# Patient Record
Sex: MALE | Race: WHITE | NOT HISPANIC OR LATINO | URBAN - METROPOLITAN AREA
[De-identification: names, ages, dates, MRNs, and addresses within clinical notes are randomized per-mention and may not be internally consistent; named-entity substitution may affect disease eponyms.]

---

## 2020-11-29 ENCOUNTER — INPATIENT (INPATIENT)
Facility: HOSPITAL | Age: 35
LOS: 5 days | Discharge: HOME | End: 2020-12-05
Attending: GENERAL PRACTICE | Admitting: GENERAL PRACTICE
Payer: MEDICAID

## 2020-11-29 VITALS
SYSTOLIC BLOOD PRESSURE: 123 MMHG | RESPIRATION RATE: 18 BRPM | OXYGEN SATURATION: 96 % | TEMPERATURE: 98 F | DIASTOLIC BLOOD PRESSURE: 81 MMHG | HEART RATE: 90 BPM

## 2020-11-29 LAB
ALBUMIN SERPL ELPH-MCNC: 4.7 G/DL — SIGNIFICANT CHANGE UP (ref 3.3–5)
ALP SERPL-CCNC: 63 U/L — SIGNIFICANT CHANGE UP (ref 40–120)
ALT FLD-CCNC: 40 U/L — SIGNIFICANT CHANGE UP (ref 4–41)
ANION GAP SERPL CALC-SCNC: 12 MMO/L — SIGNIFICANT CHANGE UP (ref 7–14)
AST SERPL-CCNC: 23 U/L — SIGNIFICANT CHANGE UP (ref 4–40)
BASE EXCESS BLDV CALC-SCNC: -0.9 MMOL/L — SIGNIFICANT CHANGE UP
BILIRUB SERPL-MCNC: 0.3 MG/DL — SIGNIFICANT CHANGE UP (ref 0.2–1.2)
BLOOD GAS VENOUS - CREATININE: 1.11 MG/DL — SIGNIFICANT CHANGE UP (ref 0.5–1.3)
BLOOD GAS VENOUS - FIO2: 21 — SIGNIFICANT CHANGE UP
BUN SERPL-MCNC: 14 MG/DL — SIGNIFICANT CHANGE UP (ref 7–23)
CALCIUM SERPL-MCNC: 9.7 MG/DL — SIGNIFICANT CHANGE UP (ref 8.4–10.5)
CHLORIDE BLDV-SCNC: 105 MMOL/L — SIGNIFICANT CHANGE UP (ref 96–108)
CHLORIDE SERPL-SCNC: 101 MMOL/L — SIGNIFICANT CHANGE UP (ref 98–107)
CO2 SERPL-SCNC: 24 MMOL/L — SIGNIFICANT CHANGE UP (ref 22–31)
CREAT SERPL-MCNC: 1.07 MG/DL — SIGNIFICANT CHANGE UP (ref 0.5–1.3)
GAS PNL BLDV: 141 MMOL/L — SIGNIFICANT CHANGE UP (ref 136–146)
GLUCOSE BLDV-MCNC: 111 MG/DL — HIGH (ref 70–99)
GLUCOSE SERPL-MCNC: 114 MG/DL — HIGH (ref 70–99)
HCO3 BLDV-SCNC: 22 MMOL/L — SIGNIFICANT CHANGE UP (ref 20–27)
HCT VFR BLD CALC: 46.6 % — SIGNIFICANT CHANGE UP (ref 39–50)
HCT VFR BLDV CALC: 51.6 % — HIGH (ref 39–51)
HGB BLD-MCNC: 15.9 G/DL — SIGNIFICANT CHANGE UP (ref 13–17)
HGB BLDV-MCNC: 16.9 G/DL — SIGNIFICANT CHANGE UP (ref 13–17)
LACTATE BLDV-MCNC: 3.9 MMOL/L — HIGH (ref 0.5–2)
MCHC RBC-ENTMCNC: 28.8 PG — SIGNIFICANT CHANGE UP (ref 27–34)
MCHC RBC-ENTMCNC: 34.1 % — SIGNIFICANT CHANGE UP (ref 32–36)
MCV RBC AUTO: 84.3 FL — SIGNIFICANT CHANGE UP (ref 80–100)
NRBC # FLD: 0 K/UL — SIGNIFICANT CHANGE UP (ref 0–0)
PCO2 BLDV: 47 MMHG — SIGNIFICANT CHANGE UP (ref 41–51)
PH BLDV: 7.34 PH — SIGNIFICANT CHANGE UP (ref 7.32–7.43)
PLATELET # BLD AUTO: 357 K/UL — SIGNIFICANT CHANGE UP (ref 150–400)
PMV BLD: 9.4 FL — SIGNIFICANT CHANGE UP (ref 7–13)
PO2 BLDV: 29 MMHG — LOW (ref 35–40)
POTASSIUM BLDV-SCNC: 3.5 MMOL/L — SIGNIFICANT CHANGE UP (ref 3.4–4.5)
POTASSIUM SERPL-MCNC: 3.7 MMOL/L — SIGNIFICANT CHANGE UP (ref 3.5–5.3)
POTASSIUM SERPL-SCNC: 3.7 MMOL/L — SIGNIFICANT CHANGE UP (ref 3.5–5.3)
PROT SERPL-MCNC: 8.3 G/DL — SIGNIFICANT CHANGE UP (ref 6–8.3)
RBC # BLD: 5.53 M/UL — SIGNIFICANT CHANGE UP (ref 4.2–5.8)
RBC # FLD: 12.7 % — SIGNIFICANT CHANGE UP (ref 10.3–14.5)
SAO2 % BLDV: 44.4 % — LOW (ref 60–85)
SODIUM SERPL-SCNC: 137 MMOL/L — SIGNIFICANT CHANGE UP (ref 135–145)
TROPONIN T, HIGH SENSITIVITY: 7 NG/L — SIGNIFICANT CHANGE UP (ref ?–14)
WBC # BLD: 15.63 K/UL — HIGH (ref 3.8–10.5)
WBC # FLD AUTO: 15.63 K/UL — HIGH (ref 3.8–10.5)

## 2020-11-29 PROCEDURE — 71046 X-RAY EXAM CHEST 2 VIEWS: CPT | Mod: 26

## 2020-11-29 PROCEDURE — 70450 CT HEAD/BRAIN W/O DYE: CPT | Mod: 26

## 2020-11-29 RX ORDER — SODIUM CHLORIDE 9 MG/ML
1000 INJECTION, SOLUTION INTRAVENOUS ONCE
Refills: 0 | Status: COMPLETED | OUTPATIENT
Start: 2020-11-29 | End: 2020-11-29

## 2020-11-29 RX ADMIN — SODIUM CHLORIDE 1000 MILLILITER(S): 9 INJECTION, SOLUTION INTRAVENOUS at 23:54

## 2020-11-29 NOTE — ED ADULT TRIAGE NOTE - CHIEF COMPLAINT QUOTE
Pt brought in by EMS from home s/p seizure like activity. Pt has a hx of a seizure 4 years ago, not currently on any medication.

## 2020-11-29 NOTE — ED PROVIDER NOTE - CLINICAL SUMMARY MEDICAL DECISION MAKING FREE TEXT BOX
34 y/o male with pmhx of MIx2, "heart artery broke", and 1 seizure many years ago presenting with seizure like activity. Seizure; CT head and neuro consult vs. cardiac event; EKG, Trop, CXR

## 2020-11-29 NOTE — ED PROVIDER NOTE - NS ED ROS FT
Gen: Denies fevers/chills   CV: Denies chest pain, palpitations  Skin: Denies rash, erythema, color changes  Resp: Denies SOB, cough  Endo: Denies sensitivity to heat, cold, increased urination  GI: Denies diarrhea, constipation, nausea, vomiting  Msk: Denies back pain, LE swelling, extremity pain  : Denies dysuria, increased frequency  Neuro: Denies weakness, numbness/tingling

## 2020-11-29 NOTE — ED ADULT NURSE NOTE - OBJECTIVE STATEMENT
35 year old male A&Ox4, ambulatory denies PHX brought in by family for eval possible seizure. As per EMS family witnessed "seizure like activity"; had seizure 1 time 4 years ago currently not on any medications. PT currently A&Ox4, denies any, all medical complaints. MD evaluated, VS as noted. Labs drawn. Comfort measures provided.  # [id number] used for HPI.

## 2020-11-29 NOTE — ED PROVIDER NOTE - PHYSICAL EXAMINATION
Gen: WDWN, NAD  HEENT: B/l pupillary dilation but reactive pupils, EOMI, no nasal discharge, mucous membranes moist  CV: RRR, +S1/S2, no M/R/G  Resp: CTAB, no W/R/R  GI: Abdomen soft non-distended, NTTP, no masses  MSK: No open wounds, no bruising, no LE edema  Neuro: CN2-12 grossly intact, A&Ox4, MS +5/5 in UE and LE BL, finger to nose smooth and rapid, gross sensation intact in UE and LE BL, gait smooth and coordinated, negative pronator drift   Psych: appropriate mood

## 2020-11-29 NOTE — ED PROVIDER NOTE - OBJECTIVE STATEMENT
History taken via  [id number] with patient. 34 y/o male with pmhx of MIx2, "heart artery broke", and 1 seizure many years ago presenting with seizure like activity. Patient denies any seizure and claims that he was sleeping and woke up and the ambulance arrived to his house. History taken again via  [id number] over phone with wife who claims patient fell off couch, had full body convulsion, foaming at mouth, and then patient turned blue and was unresponsive for 10 minutes. When patient woke up he did not know where he was. Patient denies any headache, dizziness, changes in vision/hearing, weakness, numbness/tingling, chest pain, palpitations, n/v, diaphoresis, SOB, fevers/chills, diarrhea, cough, or changes in urination. Patient also denies any drug or alcohol use.

## 2020-11-30 DIAGNOSIS — Z98.890 OTHER SPECIFIED POSTPROCEDURAL STATES: Chronic | ICD-10-CM

## 2020-11-30 DIAGNOSIS — Z29.9 ENCOUNTER FOR PROPHYLACTIC MEASURES, UNSPECIFIED: ICD-10-CM

## 2020-11-30 DIAGNOSIS — R65.10 SYSTEMIC INFLAMMATORY RESPONSE SYNDROME (SIRS) OF NON-INFECTIOUS ORIGIN WITHOUT ACUTE ORGAN DYSFUNCTION: ICD-10-CM

## 2020-11-30 DIAGNOSIS — I25.2 OLD MYOCARDIAL INFARCTION: ICD-10-CM

## 2020-11-30 DIAGNOSIS — R56.9 UNSPECIFIED CONVULSIONS: ICD-10-CM

## 2020-11-30 DIAGNOSIS — R73.9 HYPERGLYCEMIA, UNSPECIFIED: ICD-10-CM

## 2020-11-30 DIAGNOSIS — R79.89 OTHER SPECIFIED ABNORMAL FINDINGS OF BLOOD CHEMISTRY: ICD-10-CM

## 2020-11-30 DIAGNOSIS — R40.20 UNSPECIFIED COMA: ICD-10-CM

## 2020-11-30 LAB
AMPHET UR-MCNC: NEGATIVE — SIGNIFICANT CHANGE UP
APAP SERPL-MCNC: < 15 UG/ML — LOW (ref 15–25)
APPEARANCE UR: CLEAR — SIGNIFICANT CHANGE UP
APTT BLD: 30.5 SEC — SIGNIFICANT CHANGE UP (ref 27–36.3)
B PERT DNA SPEC QL NAA+PROBE: SIGNIFICANT CHANGE UP
BARBITURATES UR SCN-MCNC: NEGATIVE — SIGNIFICANT CHANGE UP
BENZODIAZ UR-MCNC: NEGATIVE — SIGNIFICANT CHANGE UP
BILIRUB UR-MCNC: NEGATIVE — SIGNIFICANT CHANGE UP
BLOOD UR QL VISUAL: NEGATIVE — SIGNIFICANT CHANGE UP
C PNEUM DNA SPEC QL NAA+PROBE: SIGNIFICANT CHANGE UP
CANNABINOIDS UR-MCNC: NEGATIVE — SIGNIFICANT CHANGE UP
COCAINE METAB.OTHER UR-MCNC: NEGATIVE — SIGNIFICANT CHANGE UP
COLOR SPEC: SIGNIFICANT CHANGE UP
ETHANOL BLD-MCNC: < 10 MG/DL — SIGNIFICANT CHANGE UP
FLUAV H1 2009 PAND RNA SPEC QL NAA+PROBE: SIGNIFICANT CHANGE UP
FLUAV H1 RNA SPEC QL NAA+PROBE: SIGNIFICANT CHANGE UP
FLUAV H3 RNA SPEC QL NAA+PROBE: SIGNIFICANT CHANGE UP
FLUAV SUBTYP SPEC NAA+PROBE: SIGNIFICANT CHANGE UP
FLUBV RNA SPEC QL NAA+PROBE: SIGNIFICANT CHANGE UP
GLUCOSE UR-MCNC: NEGATIVE — SIGNIFICANT CHANGE UP
HADV DNA SPEC QL NAA+PROBE: SIGNIFICANT CHANGE UP
HBA1C BLD-MCNC: 5.2 % — SIGNIFICANT CHANGE UP (ref 4–5.6)
HCOV PNL SPEC NAA+PROBE: SIGNIFICANT CHANGE UP
HCT VFR BLD CALC: 44 % — SIGNIFICANT CHANGE UP (ref 39–50)
HGB BLD-MCNC: 15.1 G/DL — SIGNIFICANT CHANGE UP (ref 13–17)
HMPV RNA SPEC QL NAA+PROBE: SIGNIFICANT CHANGE UP
HPIV1 RNA SPEC QL NAA+PROBE: SIGNIFICANT CHANGE UP
HPIV2 RNA SPEC QL NAA+PROBE: SIGNIFICANT CHANGE UP
HPIV3 RNA SPEC QL NAA+PROBE: SIGNIFICANT CHANGE UP
HPIV4 RNA SPEC QL NAA+PROBE: SIGNIFICANT CHANGE UP
INR BLD: 1.05 — SIGNIFICANT CHANGE UP (ref 0.88–1.16)
KETONES UR-MCNC: NEGATIVE — SIGNIFICANT CHANGE UP
LACTATE SERPL-SCNC: 1.3 MMOL/L — SIGNIFICANT CHANGE UP (ref 0.5–2)
LEUKOCYTE ESTERASE UR-ACNC: NEGATIVE — SIGNIFICANT CHANGE UP
MCHC RBC-ENTMCNC: 30 PG — SIGNIFICANT CHANGE UP (ref 27–34)
MCHC RBC-ENTMCNC: 34.3 % — SIGNIFICANT CHANGE UP (ref 32–36)
MCV RBC AUTO: 87.3 FL — SIGNIFICANT CHANGE UP (ref 80–100)
METHADONE UR-MCNC: NEGATIVE — SIGNIFICANT CHANGE UP
NITRITE UR-MCNC: NEGATIVE — SIGNIFICANT CHANGE UP
NRBC # FLD: 0 K/UL — SIGNIFICANT CHANGE UP (ref 0–0)
NT-PROBNP SERPL-SCNC: 50.51 PG/ML — SIGNIFICANT CHANGE UP
OPIATES UR-MCNC: NEGATIVE — SIGNIFICANT CHANGE UP
OXYCODONE UR-MCNC: NEGATIVE — SIGNIFICANT CHANGE UP
PCP UR-MCNC: NEGATIVE — SIGNIFICANT CHANGE UP
PH UR: 6 — SIGNIFICANT CHANGE UP (ref 5–8)
PLATELET # BLD AUTO: 296 K/UL — SIGNIFICANT CHANGE UP (ref 150–400)
PMV BLD: 9.7 FL — SIGNIFICANT CHANGE UP (ref 7–13)
PROT UR-MCNC: NEGATIVE — SIGNIFICANT CHANGE UP
PROTHROM AB SERPL-ACNC: 11.9 SEC — SIGNIFICANT CHANGE UP (ref 10.6–13.6)
RAPID RVP RESULT: SIGNIFICANT CHANGE UP
RBC # BLD: 5.04 M/UL — SIGNIFICANT CHANGE UP (ref 4.2–5.8)
RBC # FLD: 13.1 % — SIGNIFICANT CHANGE UP (ref 10.3–14.5)
RSV RNA SPEC QL NAA+PROBE: SIGNIFICANT CHANGE UP
RV+EV RNA SPEC QL NAA+PROBE: SIGNIFICANT CHANGE UP
SALICYLATES SERPL-MCNC: < 5 MG/DL — LOW (ref 15–30)
SARS-COV-2 IGG SERPL QL IA: POSITIVE
SARS-COV-2 IGM SERPL IA-ACNC: 3.7 INDEX — HIGH
SARS-COV-2 RNA SPEC QL NAA+PROBE: SIGNIFICANT CHANGE UP
SP GR SPEC: 1.02 — SIGNIFICANT CHANGE UP (ref 1–1.04)
UROBILINOGEN FLD QL: NORMAL — SIGNIFICANT CHANGE UP
WBC # BLD: 12.18 K/UL — HIGH (ref 3.8–10.5)
WBC # FLD AUTO: 12.18 K/UL — HIGH (ref 3.8–10.5)

## 2020-11-30 PROCEDURE — 99223 1ST HOSP IP/OBS HIGH 75: CPT | Mod: GC

## 2020-11-30 PROCEDURE — 93010 ELECTROCARDIOGRAM REPORT: CPT

## 2020-11-30 PROCEDURE — 99223 1ST HOSP IP/OBS HIGH 75: CPT

## 2020-11-30 PROCEDURE — 99284 EMERGENCY DEPT VISIT MOD MDM: CPT

## 2020-11-30 PROCEDURE — 99233 SBSQ HOSP IP/OBS HIGH 50: CPT

## 2020-11-30 RX ORDER — ASPIRIN/CALCIUM CARB/MAGNESIUM 324 MG
81 TABLET ORAL DAILY
Refills: 0 | Status: DISCONTINUED | OUTPATIENT
Start: 2020-11-30 | End: 2020-12-05

## 2020-11-30 RX ORDER — HEPARIN SODIUM 5000 [USP'U]/ML
5000 INJECTION INTRAVENOUS; SUBCUTANEOUS EVERY 12 HOURS
Refills: 0 | Status: DISCONTINUED | OUTPATIENT
Start: 2020-11-30 | End: 2020-12-05

## 2020-11-30 RX ORDER — POTASSIUM CHLORIDE 20 MEQ
20 PACKET (EA) ORAL ONCE
Refills: 0 | Status: COMPLETED | OUTPATIENT
Start: 2020-11-30 | End: 2020-11-30

## 2020-11-30 RX ORDER — MAGNESIUM SULFATE 500 MG/ML
1 VIAL (ML) INJECTION ONCE
Refills: 0 | Status: COMPLETED | OUTPATIENT
Start: 2020-11-30 | End: 2020-11-30

## 2020-11-30 RX ORDER — LEVETIRACETAM 250 MG/1
750 TABLET, FILM COATED ORAL
Refills: 0 | Status: DISCONTINUED | OUTPATIENT
Start: 2020-11-30 | End: 2020-12-05

## 2020-11-30 RX ADMIN — Medication 20 MILLIEQUIVALENT(S): at 07:27

## 2020-11-30 RX ADMIN — HEPARIN SODIUM 5000 UNIT(S): 5000 INJECTION INTRAVENOUS; SUBCUTANEOUS at 17:08

## 2020-11-30 RX ADMIN — Medication 100 GRAM(S): at 07:27

## 2020-11-30 RX ADMIN — Medication 81 MILLIGRAM(S): at 17:08

## 2020-11-30 NOTE — H&P ADULT - NEGATIVE MUSCULOSKELETAL SYMPTOMS
no arthralgia/no joint swelling/no arthritis/no myalgia/no muscle cramps no muscle cramps/no stiffness/no neck pain/no arthralgia/no arthritis/no joint swelling/no myalgia

## 2020-11-30 NOTE — CONSULT NOTE ADULT - ASSESSMENT
35yoM w/ PMHx CAD s/p stents? presents after seizure-like activity witnessed by his wife.  EPS consulted for ILR evaluation for cardiac etiology for unresponsiveness.      Extensive discussion regarding benefits versus risks of ILR; patient at this time does not think that a loop recorder will be beneficial to him as he believes it was the food that caused him to become unresponsive.

## 2020-11-30 NOTE — H&P ADULT - NSICDXPASTSURGICALHX_GEN_ALL_CORE_FT
PAST SURGICAL HISTORY:  H/O heart surgery      PAST SURGICAL HISTORY:  H/O heart surgery possible stent placement (based on description)

## 2020-11-30 NOTE — CONSULT NOTE ADULT - ASSESSMENT
34 yo male with PMH MI x2 and possible remote single seizure presenting with possible tonic clonic seizure activity noted by wife at home. Patient currently without symptoms or complaints and no focal findings on exam. CT head without any acute findings. Based on description of event, generalized tonic-clonic seizure with post-ictal state is possible, and given possible past history of seizure, patient should be further evaluated. Given history of MI at early age and noted abnormal EKG in ED, should also rule out cardiac causes of altered mental status.    Recommendations:  [] seizure precautions  [] vEEG to monitor for seizure activity  [] given isolated event, can hold on AEDs for now, pending EEG results  [] give ativan 2mg for generalized seizure lasting > 3 minutes  [] if EEG without findings, can get MRI head epilepsy protocol to evaluate for possible seizure focus  [] check electrolytes, infectious workup to r/o causes of provoked seizure  [] cardiac workup as determined appropriate per medicine given early MI history    Case to be discussed with neurology attending Dr. Garcia. 36 yo male with PMH MI x2 and possible remote single seizure presenting with possible tonic clonic seizure activity noted by wife at home. Patient currently without symptoms or complaints and no focal findings on exam. CT head without any acute findings. Based on description of event, generalized tonic-clonic seizure with post-ictal state is possible, and given possible past history of seizure, patient should be further evaluated. Given history of MI at early age and noted abnormal EKG in ED, should also rule out cardiac causes of altered mental status.    Recommendations:    [] seizure precautions  [] vEEG to monitor for seizure activity  [] given isolated event, can hold on AEDs for now, pending EEG results  [] give ativan 2mg for generalized seizure lasting > 3 minutes  [] if EEG without findings, can get MRI head epilepsy protocol to evaluate for possible seizure focus  [] check electrolytes, infectious workup to r/o causes of provoked seizure  [] cardiac workup as determined appropriate per medicine given early MI history    Case to be discussed with neurology attending Dr. Garcia.

## 2020-11-30 NOTE — H&P ADULT - PROBLEM SELECTOR PLAN 2
- EKG  - c/w aspirin  - consider cardio cs - EKG  - c/w aspirin  - tele monitor  - CE/ trop  - consider cardio cs  - ECHO - f/up repeat level in am -Lactic acid elevated=3.9 c/w episode of hypoxia or seizure  -repeat lactic acid =1.3  -plan as above

## 2020-11-30 NOTE — H&P ADULT - PROBLEM SELECTOR PLAN 5
- r/o diabetes  - slightly elevated sugars   - f/up A1C  - monitor FS - EKG c/w prior MI  - c/w aspirin  - plan as above

## 2020-11-30 NOTE — CONSULT NOTE ADULT - SUBJECTIVE AND OBJECTIVE BOX
*************************************  NEUROLOGY CONSULT  SERVICE  **************************************    ANDREW KAY  Male  MRN-[id number]    HPI:  34 yo male with PMH MI x2, possible stent placement years ago, reported remote history of seizure x1 years ago who presented to the ED after possible seizure like activity. Patient spoken to with [facility] interpreters, notes that he fell asleep on the couch and the next thing he remembers was being surrounded by EMS and coming to the hospital. Per wife who witnessed the episode, patient was noted to fall off the couch while having full body shaking similar in description to tonic-clonic convulsions, and he was noted to be foaming at the mouth. Patient also turned blue with minimal response for about 10 minutes, and patient was disoriented upon waking. Patient currently denies any symptoms; specifically he denies headache, dizziness, fatigue, fever/chills, nausea/vomiting, vision changes, difficulty speaking, weakness or numbness, urinary changes. He denies recent travel or sick contacts, and denies recent substance use. Patient denies any similar seizure activity in the past, but per EMS discussion with wife, wife felt that he had a seizure about 4 years ago without any workup.        ROS: All negative except as mentioned in HPI    PAST MEDICAL & SURGICAL HISTORY:  H/O heart surgery      MEDICATIONS  (STANDING):  aspirin  chewable 81 milliGRAM(s) Oral daily    MEDICATIONS  (PRN):    Allergies    No Known Allergies    Intolerances        VITAL SIGNS:  Vital Signs Last 24 Hrs  T(C): 36.6 (30 Nov 2020 04:37), Max: 37.4 (29 Nov 2020 20:28)  T(F): 97.8 (30 Nov 2020 04:37), Max: 99.4 (29 Nov 2020 20:28)  HR: 85 (30 Nov 2020 04:37) (85 - 129)  BP: 122/87 (30 Nov 2020 04:37) (115/80 - 156/84)  BP(mean): --  RR: 16 (30 Nov 2020 04:37) (16 - 20)  SpO2: 99% (30 Nov 2020 04:37) (96% - 100%)    PHYSICAL EXAMINATION:  General: Well-developed, well nourished, in no acute distress.  Eyes: Conjunctiva and sclera clear.  Neck: Supple, nontender  Lung: no respiratory distress noted    Neurologic:  - Mental Status:  Alert, awake, oriented to person, hospital, and time; Speech is fluent with intact naming, repetition, and comprehension; follows complex commands, no extinction or neglect noted;   - Cranial Nerves II-XII:  VFF, No nystagmus noted, EOMI, PERRLA, V1-V3 intact, no facial asymmetry, t/p midline, SCM/trap intact.  - Motor: Normal muscle bulk and tone throughout. No myoclonus or tremor. No pronator drift. Strength 5/5 bilaterally in UEs and LEs.  - Reflexes: 2+ biceps, brachioradialis, patellar, ankle reflexes bilaterally. Plantar responses flexor.  - Sensory:  Intact to light touch, pinprick throughout.  - Coordination:  Finger-nose-finger without dysmetria bilaterally.   - Gait:   Normal steps, base, arm swing, and turning. Romberg testing is negative.    LABS:                          15.9   15.63 )-----------( 357      ( 29 Nov 2020 20:25 )             46.6     11-29    137  |  101  |  14  ----------------------------<  114<H>  3.7   |  24  |  1.07    Ca    9.7      29 Nov 2020 20:25    TPro  8.3  /  Alb  4.7  /  TBili  0.3  /  DBili  x   /  AST  23  /  ALT  40  /  AlkPhos  63  11-29      RADIOLOGY & ADDITIONAL STUDIES:    CT Head No Cont (11.29.20 @ 20:59)  IMPRESSION:    No acute intracranial hemorrhage, mass effect, or acute displaced calvarium fracture.    Paranasal sinus disease as described above. Recommend clinical correlation to assess for acute on chronic paranasal sinus disease.    If there are new or persistent symptoms, consider further evaluation with MRI provided no contraindications.

## 2020-11-30 NOTE — H&P ADULT - NSHPSOCIALHISTORY_GEN_ALL_CORE
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

## 2020-11-30 NOTE — H&P ADULT - PROBLEM SELECTOR PLAN 1
- c/t monitor  - tele monitor  - neuro eval  - EEG   - - c/t monitor  - tele monitor  - neuro eval  - EEG   - CT head -> no acute dz, consider MRI if symptoms worsens - r/o cardiogenic cause - r/o cardiogenic cause  - EP eval for possible ILR Pt with cardaic hx in young age and possible stent placement 8-10 years ago;   Suspect LOC of possibly cardiogenic etiology in the setting of abnormal EKG and prior cardiac Hx c/b hypoxic seizure activity vs less likely new onset seizure;   -Lactic acid elevated c/w episode of hypoxia or seizure  - r/o cardiogenic cause including arrhythmia   - EP eval for possible ILR  - EKG c/w prior myocardiac infarction   - Trop=7  - Telemetry, seizure precautions   - TTE  - TSH, Lipid panel, A1c, Pro-BNP  - f/u Urine tox  - Serum tox negative  - aspiration precautions  - elevate head of bed Pt with cardaic hx in young age and possible stent placement 8-10 years ago;   Suspect LOC of possibly cardiogenic etiology in the setting of abnormal EKG and prior cardiac Hx c/b hypoxic seizure activity vs less likely new onset seizure;   -Lactic acid elevated c/w episode of hypoxia or seizure  - r/o cardiogenic cause including arrhythmia   - EP eval for possible ILR  - EKG c/w prior myocardiac infarction   - Trop=7  - Telemetry, seizure precautions   - Optimize serum potassium and magnesium   - f/u magnesium level (added on)  - TTE  - TSH, Lipid panel, A1c, Pro-BNP  - f/u Urine tox  - Serum tox negative  - aspiration precautions  - elevate head of bed -Pt with cardiac hx in young age and a possible stent placement 8-10 years ago;   -Suspect LOC of possibly cardiogenic etiology in the setting of abnormal EKG and prior cardiac Hx c/b hypoxic seizure activity vs less likely new onset seizure;   -Lactic acid elevated c/w episode of hypoxia or seizure  - r/o cardiogenic cause including arrhythmia   - EP eval for possible ILR  - EKG c/w prior myocardiac infarction   - Trop=7  - Telemetry, seizure precautions   - Optimize serum potassium and magnesium   - f/u magnesium level (added on)  - TTE  - TSH, Lipid panel, A1c, Pro-BNP  - f/u Urine tox  - Serum tox negative  - aspiration precautions  - elevate head of bed

## 2020-11-30 NOTE — H&P ADULT - PROBLEM SELECTOR PLAN 4
- heparin sq for DVT prophylaxis - EKG  - c/w aspirin  - tele monitor  - CE/ trop  - consider cardio cs  - ECHO Hypoxic seizure activity (convulsions) vs new onset seizure;  - Tele monitor  - Neurology c/s in ED  - seizure precautions  - vEEG to monitor for seizure activity  -  given isolated event, can hold on AEDs for now, pending EEG results  -  give ativan 2mg for generalized seizure lasting > 3 minutes  -  if EEG without findings, can get MRI head epilepsy protocol to evaluate for possible seizure focus  - check electrolytes, infectious workup to r/o causes of provoked seizure  - cardiac workup as above  - CT head -> no acute dz, consider MRI if symptoms worsens

## 2020-11-30 NOTE — CONSULT NOTE ADULT - SUBJECTIVE AND OBJECTIVE BOX
Date of Admission: 11/30/20     used: [facility]  [id number]    CHIEF COMPLAINT: syncope and collapse    HISTORY OF PRESENT ILLNESS:  This is a 35yoM w/ PMHx CAD s/p stents? presents after seizure-like activity witnessed by his wife.  Patient was unresponsive for 10 minutes and came to when EMS came.  During this hospitalization, patient is being followed by neurology for r/o seizure.  CTH negative for acute hemorrhage.  Pending EEF and MRI.  EPS consulted for ILR evaluation for cardiac etiology for unresponsiveness.  Patient denies any cardiac history or arrhthymias, family history of cardiac disease or sudden death.  States he is feeling well and besides his stents 8 years ago, has not had any problems related to his heart.  Denies any chest pain, palpitations, lightheadedness or dizziness.  States he thinks it is because he ate too much that caused his unresponsiveness.    Telemetry: NSR   EKG: Sinus tachycardia     Allergies  No Known Allergies    MEDICATIONS:  aspirin  chewable 81 milliGRAM(s) Oral daily  heparin   Injectable 5000 Unit(s) SubCutaneous every 12 hours    PAST MEDICAL & SURGICAL HISTORY:  Myocardial infarct    H/O heart surgery  possible stent placement (based on description)    FAMILY HISTORY:  Family history of obesity  brother &gt;300lbs    SOCIAL HISTORY:    former smoker, denies drug use     REVIEW OF SYSTEMS:  See HPI. Otherwise, 10 point ROS done and otherwise negative.    PHYSICAL EXAM:  T(C): 36.6 (11-30-20 @ 12:01), Max: 37.4 (11-29-20 @ 20:28)  HR: 86 (11-30-20 @ 12:01) (85 - 129)  BP: 120/76 (11-30-20 @ 12:01) (115/80 - 156/84)  RR: 16 (11-30-20 @ 12:01) (16 - 20)  SpO2: 98% (11-30-20 @ 12:01) (96% - 100%)  Wt(kg): --  I&O's Summary    Appearance: Normal	  HEENT:   Normal oral mucosa, PERRL, EOMI	  Lymphatic: No lymphadenopathy  Cardiovascular: Normal S1 S2, No JVD, No murmurs, No edema  Respiratory: Lungs clear to auscultation	  Psychiatry: A & O x 3, Mood & affect appropriate  Gastrointestinal:  Soft, Non-tender, + BS	  Skin: No rashes, No ecchymoses, No cyanosis	  Neurologic: Non-focal  Extremities: Normal range of motion, No clubbing, cyanosis or edema  Vascular: Peripheral pulses palpable 2+ bilaterally    LABS:	 	  CBC Full  -  ( 30 Nov 2020 06:00 )  WBC Count : 12.18 K/uL  Hemoglobin : 15.1 g/dL  Hematocrit : 44.0 %  Platelet Count - Automated : 296 K/uL  Mean Cell Volume : 87.3 fL  Mean Cell Hemoglobin : 30.0 pg  Mean Cell Hemoglobin Concentration : 34.3 %  Auto Neutrophil # : x  Auto Lymphocyte # : x  Auto Monocyte # : x  Auto Eosinophil # : x  Auto Basophil # : x  Auto Neutrophil % : x  Auto Lymphocyte % : x  Auto Monocyte % : x  Auto Eosinophil % : x  Auto Basophil % : x    11-29    137  |  101  |  14  ----------------------------<  114<H>  3.7   |  24  |  1.07    Ca    9.7      29 Nov 2020 20:25    TPro  8.3  /  Alb  4.7  /  TBili  0.3  /  DBili  x   /  AST  23  /  ALT  40  /  AlkPhos  63  11-29      proBNP: Serum Pro-Brain Natriuretic Peptide: 50.51 pg/mL (11-30 @ 06:00)

## 2020-11-30 NOTE — H&P ADULT - PROBLEM SELECTOR PLAN 6
- heparin sq for DVT prophylaxis - r/o diabetes  - slightly elevated sugars   - f/up A1C  - monitor FS

## 2020-11-30 NOTE — H&P ADULT - NEGATIVE GENERAL GENITOURINARY SYMPTOMS
no incontinence/normal urinary frequency/no hematuria no hematuria/no flank pain R/no flank pain L/no incontinence/normal urinary frequency

## 2020-11-30 NOTE — H&P ADULT - NEGATIVE OPHTHALMOLOGIC SYMPTOMS
no blurred vision L/no blurred vision R/no change in vision no blurred vision L/no blurred vision R/no diplopia/no change in vision/no photophobia

## 2020-11-30 NOTE — H&P ADULT - NSHPLABSRESULTS_GEN_ALL_CORE
15.9   15.63 )-----------( 357      ( 29 Nov 2020 20:25 )             46.6   11-29    137  |  101  |  14  ----------------------------<  114<H>  3.7   |  24  |  1.07    Ca    9.7      29 Nov 2020 20:25    TPro  8.3  /  Alb  4.7  /  TBili  0.3  /  DBili  x   /  AST  23  /  ALT  40  /  AlkPhos  63  11-29 EKG, 11/29, sinus tachycardia, 118bpm, qtc 445, Qw in III, aVF, no acute ST or Tw changes - my reading     CXR: clear lungs, no pleural effusions - my reading       CT BRAIN  PROCEDURE DATE: Nov 29 2020  No acute intracranial hemorrhage, extra-axial collection, midline shift, or mass effect.  The ventricles and sulci are within normal limits.  Gray-white differentiation is preserved.  Patchy opacification of the ethmoid air cells containing bubbly secretions with complete opacification of the bilateral frontal sinuses. Mild mucosal thickening of the bilateral sphenoid sinuses. The mastoid air cells are clear.  No acute displaced calvarium fracture. No significant scalp soft tissue swelling.  IMPRESSION:  No acute intracranial hemorrhage, mass effect, or acute displaced calvarium fracture.  Paranasal sinus disease as described above. Recommend clinical correlation to assess for acute on chronic paranasal sinus disease.  If there are new or persistent symptoms, consider further evaluation with MRI provided no contraindications.          15.9   15.63 )-----------( 357      ( 29 Nov 2020 20:25 )             46.6   11-29    137  |  101  |  14  ----------------------------<  114<H>  3.7   |  24  |  1.07    Ca    9.7      29 Nov 2020 20:25    TPro  8.3  /  Alb  4.7  /  TBili  0.3  /  DBili  x   /  AST  23  /  ALT  40  /  AlkPhos  63  11-29

## 2020-11-30 NOTE — H&P ADULT - HISTORY OF PRESENT ILLNESS
History obtained via   [id number] with patient. This is a  36 y/o male with pmhx of MI x 2, "heart artery broke and had something like a mesh" about 8-10 years ago,  presenting  to ER with seizure like activity, describes that  after he had dinner he fell asleep on the couch, and later found  himself with EMS at the site. Patient states his wife witnessed the episode  who claims patient fell off couch, had full body convulsion, foaming at mouth, and then patient turned blue and was unresponsive for 10 minutes. When patient woke up he did not know where he was. He  denies any headache, dizziness, changes in vision/hearing, weakness, numbness/tingling, chest pain, palpitations, n/v, diaphoresis, SOB, fevers/chills, no recent travel, no sick contact, no diarrhea, cough, or changes in urination. Patient also denies any drug or alcohol use. Patient denies ever having this type seizure like activity or any seizure activity. Currently pt  appears comfortable NAD, AO x 3           History obtained via   [id number] with patient. This is a  36 y/o male with pmhx of MI x 2, "heart artery broke and had something like a mesh" about 8-10 years ago,  presenting  to ER with seizure like activity, describes that  after he had dinner he fell asleep on the couch, and later found  himself with EMS at the site. Patient states his wife witnessed the episode  who claims patient fell off couch, had full body convulsion, foaming at mouth, and then patient turned blue and was unresponsive for 10 minutes. When patient woke up he did not know where he was. He denies any headache, dizziness, changes in vision/hearing, weakness, numbness/tingling, chest pain, palpitations, n/v, diaphoresis, SOB, fevers/chills, no recent travel, no sick contact, no diarrhea, cough, or changes in urination. Patient also denies any drug or alcohol use. Patient denies ever having this type seizure like activity or any seizure activity. Currently pt  appears comfortable NAD, AO x 3           History obtained via   [id number] with patient.  36 y/o male with pmhx of MI x 2, "heart artery broke and had something like a mesh" about 8-10 years ago,  presenting  to ER with seizure like activity, describes that  after he had dinner he fell asleep on the couch, and later found  himself with EMS at the site. Patient states his wife witnessed the episode  who claims patient fell off couch, had full body convulsion, foaming at mouth, and then patient turned blue and was unresponsive for 10 minutes. When patient woke up he did not know where he was. He denies any headache, dizziness, changes in vision/hearing, weakness, numbness/tingling, chest pain, palpitations, n/v, diaphoresis, SOB, fevers/chills, no recent travel, no sick contact, no diarrhea, cough, or changes in urination. Patient also denies any drug or alcohol use. Patient denies ever having this type seizure like activity or any seizure activity. Currently pt  appears comfortable NAD, AO x 3           History obtained via  [id number] with patient.  34 y/o male with MHx of of MI x 2 with a possible stent placement:  "heart artery broke and had something like a mesh" about 8-10 years ago, presenting  to ER with seizure-like activity. PT states that  after he had dinner and watched TV he fell asleep on the couch. Subsequently he "wakes up" with EMS at his side. Patient states his wife witnessed the episode: patient fell off couch, had full body convulsion, foaming at mouth, and then patient turned blue and was unresponsive for 10 minutes. When the patient woke up he did not know where he was. He repots no headache, dizziness, changes in vision/hearing, weakness, numbness/tingling, chest pain, palpitations, n/v, diaphoresis, SOB, fevers/chills, no recent travel, no sick contact, no diarrhea, cough, or changes in urination. Patient states that does not use any illicit drugs via inhalation or injection and does not use tobacco or alcohol use. Patient does not report prior episodes of seizures or seizure-like activity in the past.      Attending's addendum: states that had a remote procedure on his heart with implantation of a possible stent via Rt or Lt groin approach. Pt states that he f/u with a specialist in [place] whose name he cannot recall at present but wife will call him with the information.            History obtained via  [id number] with patient.  36 y/o male, limited English proficiency, with MHx of of MI x 2 with a possible stent placement:  "heart artery broke and had something like a mesh" about 8-10 years ago, presenting  to ER with seizure-like activity. PT states that  after he had dinner and watched TV he fell asleep on the couch. Subsequently he "wakes up" with EMS at his side. Patient states his wife witnessed the episode: patient fell off couch, had full body convulsion, foaming at mouth, and then patient turned blue and was unresponsive for 10 minutes. When the patient woke up he did not know where he was. He repots no headache, dizziness, changes in vision/hearing, weakness, numbness/tingling, chest pain, palpitations, n/v, diaphoresis, SOB, fevers/chills, no recent travel, no sick contact, no diarrhea, cough, or changes in urination. Patient states that does not use any illicit drugs via inhalation or injection and does not use tobacco or alcohol use. Patient does not report prior episodes of seizures or seizure-like activity in the past.      Attending's addendum: states that had a remote procedure on his heart with implantation of a possible stent via Rt or Lt groin approach. Pt states that he f/u with a specialist in [place] whose name he cannot recall at present but wife will call him with the information.

## 2020-11-30 NOTE — H&P ADULT - PROBLEM SELECTOR PLAN 3
- heparin sq for DVT prophylaxis - slightly elevated sugars   - f/up A1C  - monitor FS -  tele monitor  - neuro eval appreciated   - seizure precautions  - vEEG to monitor for seizure activity  -  given isolated event, can hold on AEDs for now, pending EEG results  -  give ativan 2mg for generalized seizure lasting > 3 minutes  -  if EEG without findings, can get MRI head epilepsy protocol to evaluate for possible seizure focus  - check electrolytes, infectious workup to r/o causes of provoked seizure  - cardiac workup as determined appropriate per medicine given early MI history  - EEG   - CT head -> no acute dz, consider MRI if symptoms worsens Initially HR 120s-90s; Leukocytosis 15K;  Likely due to seizure-like activity  Monitor off Abx  Monitor for fever: cannot r/o episode of aspiration   CXR: clear lungs - my reading  f/u UA  RVP negative  Covid-19 PCR negative

## 2020-11-30 NOTE — H&P ADULT - NEUROLOGICAL DETAILS
alert and oriented x 3/responds to pain/responds to verbal commands alert and oriented x 3/cranial nerves intact/normal strength/responds to pain/responds to verbal commands

## 2020-11-30 NOTE — H&P ADULT - ASSESSMENT
This is a  36 y/o male with pmhx of MI x 2, "heart artery broke and had something like a mesh" about 8-10 years ago,  presenting  to ER with seizure like activity, 36yo male, limited English proficiency, with MHx of of MI x 2 with a possible stent placement a/w  LOC of possibly cardiogenic etiology in the setting of abnormal EKG and prior cardiac Hx c/b hypoxic seizure activity convulsions vs new onset seizure;        34yo male, limited English proficiency, with MHx of of MI x 2 with a possible stent placement a/w  LOC of possibly cardiogenic etiology in the setting of abnormal EKG and prior cardiac Hx c/b hypoxic seizure activity (or convulsions) vs new onset seizure;

## 2020-11-30 NOTE — CONSULT NOTE ADULT - ATTENDING COMMENTS
35yoM w/ PMHx CAD s/p stents? presents after seizure-like activity witnessed by his wife.  EPS consulted for ILR evaluation for cardiac etiology for unresponsiveness. Pt refusing at this time. Will follow.
Patient seen and examined with neurology team and above note reviewed and I agree with assessment and plan as outlined. Patient hx as noted and he presented after passing out after dinner and was brought to the hospital.  He denied any prior symptoms before passing out and no dizziness, headaches , nausea or vomiting or numbness or weakness.   He did report biting his tongue on the right side which was new.  According to his wife who stated in the history that he fell off the couch and was having generalized shaking with foaming at his mouth.     He currently denies any symptoms at the moment and denies any seizures in the past. No drug or alcohol use reported.     On exam he is awake and alert and oriented to person, place and time.  NO cranial nerve deficits and no nystagmus but right sided tongue laceration noted   No focal motor deficits or sensory loss and no dysmetria   He did have brisk but symmetric reflexes.     Assessment and Plan . 35 year old man with likely generalized tonic clonic seizure of unclear etiology     1. Obtain 24 hour EEG and will start keppra 750mg twice daily as he has high risk of having another breakthru seizure   2. Then MRI brain with temporal lobe epilepsy protocol to rule out structural lesions   3. Seizure precautions and continue medical mgt and supportive care   4. Medical and infectious and metabolic workup   5. Check Urine toxicology     All questions answered and patient understood plan.

## 2020-12-01 LAB
ANION GAP SERPL CALC-SCNC: 11 MMO/L — SIGNIFICANT CHANGE UP (ref 7–14)
BUN SERPL-MCNC: 14 MG/DL — SIGNIFICANT CHANGE UP (ref 7–23)
CALCIUM SERPL-MCNC: 9.3 MG/DL — SIGNIFICANT CHANGE UP (ref 8.4–10.5)
CHLORIDE SERPL-SCNC: 105 MMOL/L — SIGNIFICANT CHANGE UP (ref 98–107)
CO2 SERPL-SCNC: 22 MMOL/L — SIGNIFICANT CHANGE UP (ref 22–31)
CREAT SERPL-MCNC: 0.96 MG/DL — SIGNIFICANT CHANGE UP (ref 0.5–1.3)
GLUCOSE SERPL-MCNC: 82 MG/DL — SIGNIFICANT CHANGE UP (ref 70–99)
HCT VFR BLD CALC: 44.9 % — SIGNIFICANT CHANGE UP (ref 39–50)
HGB BLD-MCNC: 15.2 G/DL — SIGNIFICANT CHANGE UP (ref 13–17)
MAGNESIUM SERPL-MCNC: 2.3 MG/DL — SIGNIFICANT CHANGE UP (ref 1.6–2.6)
MCHC RBC-ENTMCNC: 28.8 PG — SIGNIFICANT CHANGE UP (ref 27–34)
MCHC RBC-ENTMCNC: 33.9 % — SIGNIFICANT CHANGE UP (ref 32–36)
MCV RBC AUTO: 85 FL — SIGNIFICANT CHANGE UP (ref 80–100)
NRBC # FLD: 0 K/UL — SIGNIFICANT CHANGE UP (ref 0–0)
PLATELET # BLD AUTO: 311 K/UL — SIGNIFICANT CHANGE UP (ref 150–400)
PMV BLD: 9.6 FL — SIGNIFICANT CHANGE UP (ref 7–13)
POTASSIUM SERPL-MCNC: 4.1 MMOL/L — SIGNIFICANT CHANGE UP (ref 3.5–5.3)
POTASSIUM SERPL-SCNC: 4.1 MMOL/L — SIGNIFICANT CHANGE UP (ref 3.5–5.3)
RBC # BLD: 5.28 M/UL — SIGNIFICANT CHANGE UP (ref 4.2–5.8)
RBC # FLD: 12.9 % — SIGNIFICANT CHANGE UP (ref 10.3–14.5)
SODIUM SERPL-SCNC: 138 MMOL/L — SIGNIFICANT CHANGE UP (ref 135–145)
WBC # BLD: 8.61 K/UL — SIGNIFICANT CHANGE UP (ref 3.8–10.5)
WBC # FLD AUTO: 8.61 K/UL — SIGNIFICANT CHANGE UP (ref 3.8–10.5)

## 2020-12-01 PROCEDURE — 99232 SBSQ HOSP IP/OBS MODERATE 35: CPT

## 2020-12-01 RX ADMIN — Medication 81 MILLIGRAM(S): at 17:11

## 2020-12-01 RX ADMIN — LEVETIRACETAM 750 MILLIGRAM(S): 250 TABLET, FILM COATED ORAL at 05:08

## 2020-12-01 RX ADMIN — LEVETIRACETAM 750 MILLIGRAM(S): 250 TABLET, FILM COATED ORAL at 17:11

## 2020-12-01 RX ADMIN — HEPARIN SODIUM 5000 UNIT(S): 5000 INJECTION INTRAVENOUS; SUBCUTANEOUS at 05:08

## 2020-12-01 RX ADMIN — HEPARIN SODIUM 5000 UNIT(S): 5000 INJECTION INTRAVENOUS; SUBCUTANEOUS at 17:12

## 2020-12-01 NOTE — PROGRESS NOTE ADULT - ATTENDING COMMENTS
35yoM w/ PMHx CAD s/p stents? presents after seizure-like activity witnessed by his wife.  EPS consulted for ILR evaluation for cardiac etiology for unresponsiveness.  Extensive discussion regarding benefits versus risks of ILR; patient at this time does not think that a loop recorder will be beneficial to him as he believes it was the food that caused him to become unresponsive. Will follow.

## 2020-12-01 NOTE — PROGRESS NOTE ADULT - SUBJECTIVE AND OBJECTIVE BOX
Interval History:  Telemetry NSR   Denies any chest pain, palpitations, lightheadedness, dizziness    MEDICATIONS  (STANDING):  aspirin  chewable 81 milliGRAM(s) Oral daily  heparin   Injectable 5000 Unit(s) SubCutaneous every 12 hours  levETIRAcetam 750 milliGRAM(s) Oral two times a day    MEDICATIONS  (PRN):    Appearance: Normal	  HEENT:   Normal oral mucosa, PERRL, EOMI	  Lymphatic: No lymphadenopathy  Cardiovascular: Normal S1 S2, No JVD, No murmurs, No edema  Respiratory: Lungs clear to auscultation	  Psychiatry: A & O x 3, Mood & affect appropriate  Gastrointestinal:  Soft, Non-tender, + BS	  Skin: No rashes, No ecchymoses, No cyanosis	  Neurologic: Non-focal  Extremities: Normal range of motion, No clubbing, cyanosis or edema  Vascular: Peripheral pulses palpable 2+ bilaterally    Vital Signs Last 24 Hrs  T(C): 36.5 (01 Dec 2020 05:07), Max: 36.7 (30 Nov 2020 21:11)  T(F): 97.7 (01 Dec 2020 05:07), Max: 98.1 (30 Nov 2020 21:11)  HR: 70 (01 Dec 2020 05:07) (70 - 86)  BP: 122/63 (01 Dec 2020 05:07) (120/76 - 122/63)  BP(mean): --  RR: 17 (01 Dec 2020 05:07) (16 - 17)  SpO2: 100% (01 Dec 2020 05:07) (98% - 100%)    LABS:	 	    CBC Full  -  ( 01 Dec 2020 07:00 )  WBC Count : 8.61 K/uL  Hemoglobin : 15.2 g/dL  Hematocrit : 44.9 %  Platelet Count - Automated : 311 K/uL  Mean Cell Volume : 85.0 fL  Mean Cell Hemoglobin : 28.8 pg  Mean Cell Hemoglobin Concentration : 33.9 %  Auto Neutrophil # : x  Auto Lymphocyte # : x  Auto Monocyte # : x  Auto Eosinophil # : x  Auto Basophil # : x  Auto Neutrophil % : x  Auto Lymphocyte % : x  Auto Monocyte % : x  Auto Eosinophil % : x  Auto Basophil % : x    12-01    138  |  105  |  14  ----------------------------<  82  4.1   |  22  |  0.96  11-29    137  |  101  |  14  ----------------------------<  114<H>  3.7   |  24  |  1.07    Ca    9.3      01 Dec 2020 07:00  Ca    9.7      29 Nov 2020 20:25  Mg     2.3     12-01    TPro  8.3  /  Alb  4.7  /  TBili  0.3  /  DBili  x   /  AST  23  /  ALT  40  /  AlkPhos  63  11-29    PT/INR - ( 30 Nov 2020 06:00 )   PT: 11.9 SEC;   INR: 1.05         PTT - ( 30 Nov 2020 06:00 )  PTT:30.5 SEC    LIVER FUNCTIONS - ( 29 Nov 2020 20:25 )  Alb: 4.7 g/dL / Pro: 8.3 g/dL / ALK PHOS: 63 u/L / ALT: 40 u/L / AST: 23 u/L / GGT: x

## 2020-12-01 NOTE — CHART NOTE - NSCHARTNOTEFT_GEN_A_CORE
EEG Fellow's prelim read.   EEG reviewed until 1:30 pm. No seizure seen.   Official result to be followed in am

## 2020-12-02 LAB
ANION GAP SERPL CALC-SCNC: 11 MMO/L — SIGNIFICANT CHANGE UP (ref 7–14)
BUN SERPL-MCNC: 18 MG/DL — SIGNIFICANT CHANGE UP (ref 7–23)
CALCIUM SERPL-MCNC: 9.9 MG/DL — SIGNIFICANT CHANGE UP (ref 8.4–10.5)
CHLORIDE SERPL-SCNC: 100 MMOL/L — SIGNIFICANT CHANGE UP (ref 98–107)
CO2 SERPL-SCNC: 25 MMOL/L — SIGNIFICANT CHANGE UP (ref 22–31)
CREAT SERPL-MCNC: 0.98 MG/DL — SIGNIFICANT CHANGE UP (ref 0.5–1.3)
GLUCOSE SERPL-MCNC: 82 MG/DL — SIGNIFICANT CHANGE UP (ref 70–99)
HCT VFR BLD CALC: 46.2 % — SIGNIFICANT CHANGE UP (ref 39–50)
HGB BLD-MCNC: 15.4 G/DL — SIGNIFICANT CHANGE UP (ref 13–17)
MAGNESIUM SERPL-MCNC: 2.1 MG/DL — SIGNIFICANT CHANGE UP (ref 1.6–2.6)
MCHC RBC-ENTMCNC: 28.4 PG — SIGNIFICANT CHANGE UP (ref 27–34)
MCHC RBC-ENTMCNC: 33.3 % — SIGNIFICANT CHANGE UP (ref 32–36)
MCV RBC AUTO: 85.2 FL — SIGNIFICANT CHANGE UP (ref 80–100)
NRBC # FLD: 0 K/UL — SIGNIFICANT CHANGE UP (ref 0–0)
PLATELET # BLD AUTO: 317 K/UL — SIGNIFICANT CHANGE UP (ref 150–400)
PMV BLD: 9.8 FL — SIGNIFICANT CHANGE UP (ref 7–13)
POTASSIUM SERPL-MCNC: 4.1 MMOL/L — SIGNIFICANT CHANGE UP (ref 3.5–5.3)
POTASSIUM SERPL-SCNC: 4.1 MMOL/L — SIGNIFICANT CHANGE UP (ref 3.5–5.3)
RBC # BLD: 5.42 M/UL — SIGNIFICANT CHANGE UP (ref 4.2–5.8)
RBC # FLD: 12.7 % — SIGNIFICANT CHANGE UP (ref 10.3–14.5)
SODIUM SERPL-SCNC: 136 MMOL/L — SIGNIFICANT CHANGE UP (ref 135–145)
WBC # BLD: 8.86 K/UL — SIGNIFICANT CHANGE UP (ref 3.8–10.5)
WBC # FLD AUTO: 8.86 K/UL — SIGNIFICANT CHANGE UP (ref 3.8–10.5)

## 2020-12-02 PROCEDURE — 95720 EEG PHY/QHP EA INCR W/VEEG: CPT

## 2020-12-02 PROCEDURE — 93306 TTE W/DOPPLER COMPLETE: CPT | Mod: 26

## 2020-12-02 PROCEDURE — 99232 SBSQ HOSP IP/OBS MODERATE 35: CPT

## 2020-12-02 RX ADMIN — HEPARIN SODIUM 5000 UNIT(S): 5000 INJECTION INTRAVENOUS; SUBCUTANEOUS at 05:36

## 2020-12-02 RX ADMIN — Medication 81 MILLIGRAM(S): at 05:36

## 2020-12-02 RX ADMIN — LEVETIRACETAM 750 MILLIGRAM(S): 250 TABLET, FILM COATED ORAL at 17:49

## 2020-12-02 RX ADMIN — LEVETIRACETAM 750 MILLIGRAM(S): 250 TABLET, FILM COATED ORAL at 05:36

## 2020-12-02 NOTE — EEG REPORT - NS EEG TEXT BOX
ANDREW KAY [id number] 35y (1985)M  Admitting MD: Dr. Michael Sotelo    Study Date: 12-02-20    --------------------------------------------------------------------------------------------------  History:  CC/ HPI Patient is a 35y old  Male who presents with a chief complaint of LOC    aspirin  chewable 81 milliGRAM(s) Oral daily  heparin   Injectable 5000 Unit(s) SubCutaneous every 12 hours  levETIRAcetam 750 milliGRAM(s) Oral two times a day    --------------------------------------------------------------------------------------------------  Study Interpretation:    [[[Abbreviation Key:  PDR=alpha rhythm/posterior dominant rhythm. A-P=anterior posterior gradient.  Amplitude: ‘very low’:<20; ‘low’:20-50; ‘medium’:; ‘high’:>200uV.  Persistence for periodic/rhythmic patterns (% of epoch) ‘rare’:<1%; ‘occasional’:1-10%; ‘frequent’:10-50%; ‘abundant’:50-90%; ‘continuous’:>90%.  Persistence for sporadic discharges: ‘rare’:<1/hr; ‘occasional’:1/min-1/hr; ‘frequent’:>1/min; ‘abundant’:>1/10 sec.  GRDA=generalized rhythmic delta activity, LRDA=lateralized rhythmic delta activity, TIRDA=temporal intermittent rhythmic delta activity, FIRDA=frontal intermittent rhythmic activity. LPD=PLED=lateralized periodic discharges, GPD=generalized periodic discharges, BiPDs=BiPLEDs=bilateral independent periodic epileptiform discharges, SIRPID=stimulus induced rhythmic, periodic, or ictal appearing discharges.  Modifiers: +F=with fast component, +S=with spike component, +R=with rhythmic component.  S-B=burst suppression pattern.  Max=maximal. N1-drowsy, N2-stage II sleep, N3-slow wave sleep.  HV=hyperventilation, PS=photic stimulation]]]    FINDINGS:  The background was continuous, spontaneously variable and reactive.  During wakefulness, the posteriorly dominant rhythm consisted of symmetric, well modulated  10hz activity, with an amplitude to 40 uV, that attenuated to eye opening.  Low amplitude central beta was noted in wakefulness.    Background Slowing:  Generalized slowing: none was present.  Focal slowing: none was present.    Sleep Background:  Drowsiness was characterized by fragmentation, attenuation, and slowing of the background activity.    Sleep was characterized by the presence of vertex waves, symmetric spindles, and K-complexes.    Epileptiform Activity:   No epileptiform discharges were present.    Events:  No clinical events were recorded.  No seizures were recorded.    Activation Procedures:   -Hyperventilation was not performed.    -Photic stimulation was performed and did not elicit any abnormalities.      Artifacts:  Intermittent myogenic and movement artifacts were noted.    ECG:  The heart rate on single channel ECG at baseline was predominantly near BPM = 60-66 at baseline  -----------------------------------------------------------------------------------------------------    EEG Classification / Summary:  Normal EEG study, awake / drowsy / asleep    -  -----------------------------------------------------------------------------------------------------    Clinical Impression:  There were no epileptiform abnormalities recorded.      -------------------------------------------------------------------------------------------------------  [facility] EEG Reading Room Ph#: [phone]  Epilepsy Answering Service after 5PM and before 8:30AM: Ph#: [phone]    Alec Garibay M.D.   of Neurology, [facility] Epilepsy [place]	   ANDREW KAY [id number] 35y (1985)M  Admitting MD: Dr. Michael Sotelo    Study Date: 12-1-20 12:00 - 08:00 12-02-20 x 20hrs    --------------------------------------------------------------------------------------------------  History:  CC/ HPI Patient is a 35y old  Male who presents with a chief complaint of LOC    aspirin  chewable 81 milliGRAM(s) Oral daily  heparin   Injectable 5000 Unit(s) SubCutaneous every 12 hours  levETIRAcetam 750 milliGRAM(s) Oral two times a day    --------------------------------------------------------------------------------------------------  Study Interpretation:    [[[Abbreviation Key:  PDR=alpha rhythm/posterior dominant rhythm. A-P=anterior posterior gradient.  Amplitude: ‘very low’:<20; ‘low’:20-50; ‘medium’:; ‘high’:>200uV.  Persistence for periodic/rhythmic patterns (% of epoch) ‘rare’:<1%; ‘occasional’:1-10%; ‘frequent’:10-50%; ‘abundant’:50-90%; ‘continuous’:>90%.  Persistence for sporadic discharges: ‘rare’:<1/hr; ‘occasional’:1/min-1/hr; ‘frequent’:>1/min; ‘abundant’:>1/10 sec.  GRDA=generalized rhythmic delta activity, LRDA=lateralized rhythmic delta activity, TIRDA=temporal intermittent rhythmic delta activity, FIRDA=frontal intermittent rhythmic activity. LPD=PLED=lateralized periodic discharges, GPD=generalized periodic discharges, BiPDs=BiPLEDs=bilateral independent periodic epileptiform discharges, SIRPID=stimulus induced rhythmic, periodic, or ictal appearing discharges.  Modifiers: +F=with fast component, +S=with spike component, +R=with rhythmic component.  S-B=burst suppression pattern.  Max=maximal. N1-drowsy, N2-stage II sleep, N3-slow wave sleep.  HV=hyperventilation, PS=photic stimulation]]]    FINDINGS:  The background was continuous, spontaneously variable and reactive.  During wakefulness, the posteriorly dominant rhythm consisted of symmetric, well modulated  10hz activity, with an amplitude to 40 uV, that attenuated to eye opening.  Low amplitude central beta was noted in wakefulness.    Background Slowing:  Generalized slowing: none was present.  Focal slowing: none was present.    Sleep Background:  Drowsiness was characterized by fragmentation, attenuation, and slowing of the background activity.    Sleep was characterized by the presence of vertex waves, symmetric spindles, and K-complexes.    Epileptiform Activity:   No epileptiform discharges were present.    Events:  No clinical events were recorded.  No seizures were recorded.    Activation Procedures:   -Hyperventilation was not performed.    -Photic stimulation was performed and did not elicit any abnormalities.      Artifacts:  Intermittent myogenic and movement artifacts were noted.    ECG:  The heart rate on single channel ECG at baseline was predominantly near BPM = 60-66 at baseline  -----------------------------------------------------------------------------------------------------    EEG Classification / Summary:  Normal EEG study, awake / drowsy / asleep    -  -----------------------------------------------------------------------------------------------------    Clinical Impression:  There were no epileptiform abnormalities recorded.      -------------------------------------------------------------------------------------------------------  [facility] EEG Reading Room Ph#: [phone]  Epilepsy Answering Service after 5PM and before 8:30AM: Ph#: [phone]    Alec Garibay M.D.   of Neurology, [facility] Epilepsy [place]

## 2020-12-02 NOTE — PROGRESS NOTE ADULT - SUBJECTIVE AND OBJECTIVE BOX
[id number]    Subjective: Denies HA, lightheadedness, dizziness, CP, SOB, abdominal pain, N/V.      Interval events:  -p/w LOC and seizure like activity with possible generalized tonic-clonic seizure with post-ictal state. CT head without any acute finding and EEG revealed no epileptiform abnormalities  - Ep consulted for ILR to determine with his LOC etiology was arrhythmia related. Patient currently does not want a ILR. The risk and benefits for the procedure was explained in detail which included but not limited to bleeding, infection, and stroke. Patient expressed understanding an all questions were answered    MEDICATIONS  (STANDING):  aspirin  chewable 81 milliGRAM(s) Oral daily  heparin   Injectable 5000 Unit(s) SubCutaneous every 12 hours  levETIRAcetam 750 milliGRAM(s) Oral two times a day    MEDICATIONS  (PRN):    Vital Signs Last 24 Hrs  T(C): 36.8 (02 Dec 2020 11:19), Max: 36.8 (02 Dec 2020 05:34)  T(F): 98.2 (02 Dec 2020 11:19), Max: 98.2 (02 Dec 2020 05:34)  HR: 72 (02 Dec 2020 11:19) (72 - 82)  BP: 110/70 (02 Dec 2020 11:19) (100/65 - 117/72)  BP(mean): --  RR: 17 (02 Dec 2020 11:19) (16 - 17)  SpO2: 96% (02 Dec 2020 11:19) (96% - 100%)  I&O's Detail      Physical Exam:  GENERAL: Lying in bed, well appearing, speaking in full sentence, in NAD  HEART: S1S2 RRR  PULMONARY: CTABL, normal respiratory effort.  No rales, wheezing, or rhonchi appreciated bilaterally.   ABDOMEN: + Bowel sounds present, soft, NDNT  EXTREMITIES:  Warm, well -perfused, no pedal edema, distal pulses present  NEUROLOGICAL:AOx3     TELEMETERIC: Sr HR 70-80 no events                             15.4   8.86  )-----------( 317      ( 02 Dec 2020 07:06 )             46.2       12-02    136  |  100  |  18  ----------------------------<  82  4.1   |  25  |  0.98    Ca    9.9      02 Dec 2020 07:06  Mg     2.1     12-02

## 2020-12-02 NOTE — PROGRESS NOTE ADULT - ATTENDING COMMENTS
35yoM w/ PMHx CAD s/p stents? presents after seizure-like activity witnessed by his wife.  EPS consulted for ILR evaluation for cardiac etiology for unresponsiveness.  Extensive discussion regarding benefits versus risks of ILR; patient at this time does not think that a loop recorder will be beneficial to him as he believes it was the food that caused him to become unresponsive. Will reconsult if changes mind.

## 2020-12-02 NOTE — EEG REPORT - NS EEG TEXT BOX
ANDREW KAY MRN-[id number] 35y (1985)M  Admitting MD: Dr. Michael Sotelo    Study Date: 12-2-20 12:00 - 08:00 12-02-20 x 7hrs 21 minutes    --------------------------------------------------------------------------------------------------  History:  CC/ HPI Patient is a 35y old  Male who presents with a chief complaint of LOC    aspirin  chewable 81 milliGRAM(s) Oral daily  heparin   Injectable 5000 Unit(s) SubCutaneous every 12 hours  levETIRAcetam 750 milliGRAM(s) Oral two times a day    --------------------------------------------------------------------------------------------------  Study Interpretation:    [[[Abbreviation Key:  PDR=alpha rhythm/posterior dominant rhythm. A-P=anterior posterior gradient.  Amplitude: ‘very low’:<20; ‘low’:20-50; ‘medium’:; ‘high’:>200uV.  Persistence for periodic/rhythmic patterns (% of epoch) ‘rare’:<1%; ‘occasional’:1-10%; ‘frequent’:10-50%; ‘abundant’:50-90%; ‘continuous’:>90%.  Persistence for sporadic discharges: ‘rare’:<1/hr; ‘occasional’:1/min-1/hr; ‘frequent’:>1/min; ‘abundant’:>1/10 sec.  GRDA=generalized rhythmic delta activity, LRDA=lateralized rhythmic delta activity, TIRDA=temporal intermittent rhythmic delta activity, FIRDA=frontal intermittent rhythmic activity. LPD=PLED=lateralized periodic discharges, GPD=generalized periodic discharges, BiPDs=BiPLEDs=bilateral independent periodic epileptiform discharges, SIRPID=stimulus induced rhythmic, periodic, or ictal appearing discharges.  Modifiers: +F=with fast component, +S=with spike component, +R=with rhythmic component.  S-B=burst suppression pattern.  Max=maximal. N1-drowsy, N2-stage II sleep, N3-slow wave sleep.  HV=hyperventilation, PS=photic stimulation]]]    FINDINGS:  The background was continuous, spontaneously variable and reactive.  During wakefulness, the posteriorly dominant rhythm consisted of symmetric, well modulated  10hz activity, with an amplitude to 40 uV, that attenuated to eye opening.  Low amplitude central beta was noted in wakefulness.    Background Slowing:  Generalized slowing: none was present.  Focal slowing: none was present.    Sleep Background:  Drowsiness was characterized by fragmentation, attenuation, and slowing of the background activity.    Sleep was characterized by the presence of vertex waves, symmetric spindles, and K-complexes.    Epileptiform Activity:   No epileptiform discharges were present.    Events:  No clinical events were recorded.  No seizures were recorded.    Activation Procedures:   -Hyperventilation was not performed.    -Photic stimulation was performed and did not elicit any abnormalities.      Artifacts:  Intermittent myogenic and movement artifacts were noted.    ECG:  The heart rate on single channel ECG at baseline was predominantly near BPM = 60-66 at baseline  -----------------------------------------------------------------------------------------------------    EEG Classification / Summary:  Normal EEG study, awake / drowsy / asleep    -  -----------------------------------------------------------------------------------------------------    Clinical Impression:  There were no epileptiform abnormalities recorded.      -------------------------------------------------------------------------------------------------------  [facility] EEG Reading Room Ph#: [phone]  Epilepsy Answering Service after 5PM and before 8:30AM: Ph#: [phone]      Note: This is a preliminary report pending attending review.    Abdiaziz Cordova MD  Epilepsy Fellow   ANDREW KAY [id number] 35y (1985)M  Admitting MD: Dr. Michael Sotelo    Study Date: 12-2-20 12:00 - 08:00 12-02-20 x 7hrs 21 minutes    --------------------------------------------------------------------------------------------------  History:  CC/ HPI Patient is a 35y old  Male who presents with a chief complaint of LOC    aspirin  chewable 81 milliGRAM(s) Oral daily  heparin   Injectable 5000 Unit(s) SubCutaneous every 12 hours  levETIRAcetam 750 milliGRAM(s) Oral two times a day    --------------------------------------------------------------------------------------------------  Study Interpretation:    [[[Abbreviation Key:  PDR=alpha rhythm/posterior dominant rhythm. A-P=anterior posterior gradient.  Amplitude: ‘very low’:<20; ‘low’:20-50; ‘medium’:; ‘high’:>200uV.  Persistence for periodic/rhythmic patterns (% of epoch) ‘rare’:<1%; ‘occasional’:1-10%; ‘frequent’:10-50%; ‘abundant’:50-90%; ‘continuous’:>90%.  Persistence for sporadic discharges: ‘rare’:<1/hr; ‘occasional’:1/min-1/hr; ‘frequent’:>1/min; ‘abundant’:>1/10 sec.  GRDA=generalized rhythmic delta activity, LRDA=lateralized rhythmic delta activity, TIRDA=temporal intermittent rhythmic delta activity, FIRDA=frontal intermittent rhythmic activity. LPD=PLED=lateralized periodic discharges, GPD=generalized periodic discharges, BiPDs=BiPLEDs=bilateral independent periodic epileptiform discharges, SIRPID=stimulus induced rhythmic, periodic, or ictal appearing discharges.  Modifiers: +F=with fast component, +S=with spike component, +R=with rhythmic component.  S-B=burst suppression pattern.  Max=maximal. N1-drowsy, N2-stage II sleep, N3-slow wave sleep.  HV=hyperventilation, PS=photic stimulation]]]    FINDINGS:  The background was continuous, spontaneously variable and reactive.  During wakefulness, the posteriorly dominant rhythm consisted of symmetric, well modulated  10hz activity, with an amplitude to 40 uV, that attenuated to eye opening.  Low amplitude central beta was noted in wakefulness.    Background Slowing:  Generalized slowing: none was present.  Focal slowing: none was present.    Sleep Background:  Drowsiness was characterized by fragmentation, attenuation, and slowing of the background activity.    Sleep was characterized by the presence of vertex waves, symmetric spindles, and K-complexes.    Epileptiform Activity:   No epileptiform discharges were present.    Events:  No clinical events were recorded.  No seizures were recorded.    Activation Procedures:   -Hyperventilation was not performed.    -Photic stimulation was performed and did not elicit any abnormalities.      Artifacts:  Intermittent myogenic and movement artifacts were noted.    ECG:  The heart rate on single channel ECG at baseline was predominantly near BPM = 60-66 at baseline  -----------------------------------------------------------------------------------------------------    EEG Classification / Summary:  Normal EEG study, awake / drowsy / asleep    -  -----------------------------------------------------------------------------------------------------    Clinical Impression:  There were no epileptiform abnormalities recorded.      -------------------------------------------------------------------------------------------------------  [facility] EEG Reading Room Ph#: [phone]  Epilepsy Answering Service after 5PM and before 8:30AM: Ph#: [phone]          Abdiaziz Cordova MD  Epilepsy Fellow

## 2020-12-03 ENCOUNTER — TRANSCRIPTION ENCOUNTER (OUTPATIENT)
Age: 35
End: 2020-12-03

## 2020-12-03 PROCEDURE — 99232 SBSQ HOSP IP/OBS MODERATE 35: CPT | Mod: GC

## 2020-12-03 RX ADMIN — Medication 81 MILLIGRAM(S): at 05:48

## 2020-12-03 RX ADMIN — LEVETIRACETAM 750 MILLIGRAM(S): 250 TABLET, FILM COATED ORAL at 17:31

## 2020-12-03 RX ADMIN — LEVETIRACETAM 750 MILLIGRAM(S): 250 TABLET, FILM COATED ORAL at 05:48

## 2020-12-03 RX ADMIN — HEPARIN SODIUM 5000 UNIT(S): 5000 INJECTION INTRAVENOUS; SUBCUTANEOUS at 05:47

## 2020-12-03 NOTE — DISCHARGE NOTE PROVIDER - CARE PROVIDER_API CALL
Michael Sotelo  INTERNAL MEDICINE  [address] [place] [address] [address]  [place] [address]  Phone: [phone]  Fax: [phone]  Follow Up Time:

## 2020-12-03 NOTE — DISCHARGE NOTE PROVIDER - HOSPITAL COURSE
History obtained via  [id number] with patient.  36 y/o Cypriot speaking male, limited English proficiency, with MHx of of MI x 2 with a possible stent placement:  "heart artery broke and had something like a mesh" about 8-10 years ago, presenting  to ER with seizure-like activity. PT states that  after he had dinner and watched TV he fell asleep on the couch. Subsequently he "wakes up" with EMS at his side. Patient states his wife witnessed the episode: patient fell off couch, had full body convulsion, foaming at mouth, and then patient turned blue and was unresponsive for 10 minutes. When the patient woke up he did not know where he was. He repots no headache, dizziness, changes in vision/hearing, weakness, numbness/tingling, chest pain, palpitations, n/v, diaphoresis, SOB, fevers/chills, no recent travel, no sick contact, no diarrhea, cough, or changes in urination. Patient states that does not use any illicit drugs via inhalation or injection and does not use tobacco or alcohol use. Patient does not report prior episodes of seizures or seizure-like activity in the past.      Attending's addendum: states that had a remote procedure on his heart with implantation of a possible stent via Rt or Lt groin approach. Pt states that he f/u with a specialist in [place] whose name he cannot recall at present but wife will call him with the information.     Problem/Plan - 1:  ·  Problem: Loss of consciousness.  Plan: -Pt with cardiac hx in young age and a possible stent placement 8-10 years ago;   LOC : possibly cardiogenic etiology vs seizure  - r/o cardiogenic cause including arrhythmia   - EP appreciated >> pt refused  ILR   - Telemetry  seizure precautions   - TTE pending ( ? OP if not done today)   - EEG negative      MRI to be done per neurology recom   - EP/ Cardio, Neuro follow up    Problem/Plan - 2:  ·  Problem: Seizure-like activity  - Neurology following  - seizure precautions  - vEEG to monitor for seizure activity  -  AEDs per neuro   -  ativan PRN  - cardiac workup as above  - MRI as above    Problem/Plan - 3:  ·  Problem: h/o CAD and Myocardial infarct  - c/w aspirin  - monitor    Problem/Plan - 4:  Problem: Hyperglycemia. Plan: - r/o diabetes  - slightly elevated sugars   - A1C 5.2  - monitor FS.     History obtained via  [id number] with patient.  36 y/o Anguillan speaking male, limited English proficiency, with MHx of of MI x 2 with a possible stent placement:  "heart artery broke and had something like a mesh" about 8-10 years ago, presenting  to ER with seizure-like activity. PT states that  after he had dinner and watched TV he fell asleep on the couch. Subsequently he "wakes up" with EMS at his side. Patient states his wife witnessed the episode: patient fell off couch, had full body convulsion, foaming at mouth, and then patient turned blue and was unresponsive for 10 minutes. When the patient woke up he did not know where he was. He repots no headache, dizziness, changes in vision/hearing, weakness, numbness/tingling, chest pain, palpitations, n/v, diaphoresis, SOB, fevers/chills, no recent travel, no sick contact, no diarrhea, cough, or changes in urination. Patient states that does not use any illicit drugs via inhalation or injection and does not use tobacco or alcohol use. Patient does not report prior episodes of seizures or seizure-like activity in the past.      Attending's addendum: states that had a remote procedure on his heart with implantation of a possible stent via Rt or Lt groin approach. Pt states that he f/u with a specialist in [place] whose name he cannot recall at present but wife will call him with the information.     Pt with cardiac hx in young age and a possible stent placement 8-10 years ago. LOC : possibly cardiogenic etiology vs seizure EP eval, pt refused  ILR   EEG negative.  MRI No findings to suggest seizure etiology. Frontoethmoidal predominant chronic paranasal sinus disease.    on 12/5/2020 pt is medically optimized for discharge          History obtained via  [id number] with patient.  36 y/o Tunisian speaking male, limited English proficiency, with MHx of of MI x 2 with a possible stent placement:  "heart artery broke and had something like a mesh" about 8-10 years ago, presenting  to ER with seizure-like activity. PT states that  after he had dinner and watched TV he fell asleep on the couch. Subsequently he "wakes up" with EMS at his side. Patient states his wife witnessed the episode: patient fell off couch, had full body convulsion, foaming at mouth, and then patient turned blue and was unresponsive for 10 minutes. When the patient woke up he did not know where he was. He repots no headache, dizziness, changes in vision/hearing, weakness, numbness/tingling, chest pain, palpitations, n/v, diaphoresis, SOB, fevers/chills, no recent travel, no sick contact, no diarrhea, cough, or changes in urination. Patient states that does not use any illicit drugs via inhalation or injection and does not use tobacco or alcohol use. Patient does not report prior episodes of seizures or seizure-like activity in the past.      Attending's addendum: states that had a remote procedure on his heart with implantation of a possible stent via Rt or Lt groin approach. Pt states that he f/u with a specialist in [place] whose name he cannot recall at present but wife will call him with the information.     Pt with cardiac hx in young age and a possible stent placement 8-10 years ago. LOC : possibly cardiogenic etiology vs seizure EP eval, pt refused  ILR   EEG negative.  MRI No findings to suggest seizure etiology. Frontoethmoidal predominant chronic paranasal sinus disease.    on 12/5/2020 pt is medically optimized for discharge

## 2020-12-03 NOTE — DISCHARGE NOTE PROVIDER - NSFOLLOWUPCLINICS_GEN_ALL_ED_FT
[facility]  Neurology Autoimmune Encephalitis  [address] [place] [address]  Phone: [phone]  Fax: [phone]  Follow Up Time:

## 2020-12-03 NOTE — DISCHARGE NOTE PROVIDER - NSDCCPCAREPLAN_GEN_ALL_CORE_FT
PRINCIPAL DISCHARGE DIAGNOSIS  Diagnosis: Seizure  Assessment and Plan of Treatment: MRI showe: No findings to suggest seizure etiology.  Frontoethmoidal predominant chronic paranasal sinus disease.  Continue  keppra 750mg 2 x day   Follow-up as outpatient at [address] [place] [address] with the neurologist that specializes with epilepsy camille groves further plan of care  DO NOT DRIVE until clear by Seizure precautions      SECONDARY DISCHARGE DIAGNOSES  Diagnosis: Coronary heart disease  Assessment and Plan of Treatment: Continue daily Aspirin. Discuss with your pt the need to take cholesterol lowering medication

## 2020-12-03 NOTE — DISCHARGE NOTE PROVIDER - NSDCMRMEDTOKEN_GEN_ALL_CORE_FT
aspirin 81 mg oral tablet, chewable: 1 tab(s) orally once a day   aspirin 81 mg oral tablet, chewable: 1 tab(s) orally once a day  levETIRAcetam 750 mg oral tablet: 1 tab(s) orally 2 times a day

## 2020-12-03 NOTE — DISCHARGE NOTE PROVIDER - NSDCCAREPROVSEEN_GEN_ALL_CORE_FT
Michael Sotelo Akanksha Rodriguez [facility]  Michael [facility]  Haisam Ismail Haroon Khawar Helen Bloch Anna Aspras Julian Andrade  Team [facility] Medicine ACP

## 2020-12-03 NOTE — PROGRESS NOTE ADULT - ATTENDING COMMENTS
Patient seen and examined with neurology team and above note reviewed and I agree with assessment and plan as outlined. Patient hx as noted and he has been feeling well and no further seizures.   He denies any other new neurologic complaints.     Exam is nonfocal and EG reviewed and no seizures or epileptiform activity.    MRI brain pending    Plan: Seizure: continue keppra 750mg BID and seizure precautions     2. Await MRI brain with TLE protocol     3. Continue medical mgt and supportive care and all questions answered.

## 2020-12-04 PROCEDURE — 70551 MRI BRAIN STEM W/O DYE: CPT | Mod: 26

## 2020-12-04 RX ADMIN — Medication 81 MILLIGRAM(S): at 05:44

## 2020-12-04 RX ADMIN — LEVETIRACETAM 750 MILLIGRAM(S): 250 TABLET, FILM COATED ORAL at 05:44

## 2020-12-04 RX ADMIN — LEVETIRACETAM 750 MILLIGRAM(S): 250 TABLET, FILM COATED ORAL at 17:11

## 2020-12-05 ENCOUNTER — TRANSCRIPTION ENCOUNTER (OUTPATIENT)
Age: 35
End: 2020-12-05

## 2020-12-05 VITALS
RESPIRATION RATE: 18 BRPM | HEART RATE: 76 BPM | SYSTOLIC BLOOD PRESSURE: 110 MMHG | TEMPERATURE: 97 F | OXYGEN SATURATION: 100 % | DIASTOLIC BLOOD PRESSURE: 69 MMHG

## 2020-12-05 LAB
ANION GAP SERPL CALC-SCNC: 13 MMO/L — SIGNIFICANT CHANGE UP (ref 7–14)
BUN SERPL-MCNC: 15 MG/DL — SIGNIFICANT CHANGE UP (ref 7–23)
CALCIUM SERPL-MCNC: 10.1 MG/DL — SIGNIFICANT CHANGE UP (ref 8.4–10.5)
CHLORIDE SERPL-SCNC: 100 MMOL/L — SIGNIFICANT CHANGE UP (ref 98–107)
CO2 SERPL-SCNC: 23 MMOL/L — SIGNIFICANT CHANGE UP (ref 22–31)
CREAT SERPL-MCNC: 0.96 MG/DL — SIGNIFICANT CHANGE UP (ref 0.5–1.3)
GLUCOSE SERPL-MCNC: 74 MG/DL — SIGNIFICANT CHANGE UP (ref 70–99)
HCT VFR BLD CALC: 46.3 % — SIGNIFICANT CHANGE UP (ref 39–50)
HGB BLD-MCNC: 15.9 G/DL — SIGNIFICANT CHANGE UP (ref 13–17)
MAGNESIUM SERPL-MCNC: 2.2 MG/DL — SIGNIFICANT CHANGE UP (ref 1.6–2.6)
MCHC RBC-ENTMCNC: 30 PG — SIGNIFICANT CHANGE UP (ref 27–34)
MCHC RBC-ENTMCNC: 34.3 % — SIGNIFICANT CHANGE UP (ref 32–36)
MCV RBC AUTO: 87.4 FL — SIGNIFICANT CHANGE UP (ref 80–100)
NRBC # FLD: 0 K/UL — SIGNIFICANT CHANGE UP (ref 0–0)
PHOSPHATE SERPL-MCNC: 3.3 MG/DL — SIGNIFICANT CHANGE UP (ref 2.5–4.5)
PLATELET # BLD AUTO: 294 K/UL — SIGNIFICANT CHANGE UP (ref 150–400)
PMV BLD: 9.8 FL — SIGNIFICANT CHANGE UP (ref 7–13)
POTASSIUM SERPL-MCNC: 4 MMOL/L — SIGNIFICANT CHANGE UP (ref 3.5–5.3)
POTASSIUM SERPL-SCNC: 4 MMOL/L — SIGNIFICANT CHANGE UP (ref 3.5–5.3)
RBC # BLD: 5.3 M/UL — SIGNIFICANT CHANGE UP (ref 4.2–5.8)
RBC # FLD: 12.9 % — SIGNIFICANT CHANGE UP (ref 10.3–14.5)
SODIUM SERPL-SCNC: 136 MMOL/L — SIGNIFICANT CHANGE UP (ref 135–145)
WBC # BLD: 7.89 K/UL — SIGNIFICANT CHANGE UP (ref 3.8–10.5)
WBC # FLD AUTO: 7.89 K/UL — SIGNIFICANT CHANGE UP (ref 3.8–10.5)

## 2020-12-05 RX ORDER — ASPIRIN/CALCIUM CARB/MAGNESIUM 324 MG
1 TABLET ORAL
Qty: 30 | Refills: 0
Start: 2020-12-05 | End: 2021-01-03

## 2020-12-05 RX ORDER — ASPIRIN/CALCIUM CARB/MAGNESIUM 324 MG
1 TABLET ORAL
Qty: 0 | Refills: 0 | DISCHARGE

## 2020-12-05 RX ORDER — LEVETIRACETAM 250 MG/1
1 TABLET, FILM COATED ORAL
Qty: 60 | Refills: 0
Start: 2020-12-05 | End: 2021-01-03

## 2020-12-05 RX ORDER — LEVETIRACETAM 250 MG/1
1 TABLET, FILM COATED ORAL
Qty: 0 | Refills: 0 | DISCHARGE
Start: 2020-12-05

## 2020-12-05 RX ADMIN — HEPARIN SODIUM 5000 UNIT(S): 5000 INJECTION INTRAVENOUS; SUBCUTANEOUS at 04:37

## 2020-12-05 RX ADMIN — Medication 81 MILLIGRAM(S): at 04:37

## 2020-12-05 RX ADMIN — LEVETIRACETAM 750 MILLIGRAM(S): 250 TABLET, FILM COATED ORAL at 04:37

## 2020-12-05 NOTE — DISCHARGE NOTE NURSING/CASE MANAGEMENT/SOCIAL WORK - PATIENT PORTAL LINK FT
You can access the FollowMyHealth Patient Portal offered by [facility] by registering at the following website: [email or web address]. By joining [email or web address]’s FollowMyHealth portal, you will also be able to view your health information using other applications (apps) compatible with our system.

## 2020-12-08 PROBLEM — Z00.00 ENCOUNTER FOR PREVENTIVE HEALTH EXAMINATION: Status: ACTIVE | Noted: 2020-12-08

## 2020-12-08 PROBLEM — I21.9 ACUTE MYOCARDIAL INFARCTION, UNSPECIFIED: Chronic | Status: ACTIVE | Noted: 2020-11-30

## 2020-12-14 ENCOUNTER — APPOINTMENT (OUTPATIENT)
Dept: NEUROLOGY | Facility: CLINIC | Age: 35
End: 2020-12-14
Payer: SELF-PAY

## 2020-12-14 VITALS
SYSTOLIC BLOOD PRESSURE: 133 MMHG | DIASTOLIC BLOOD PRESSURE: 87 MMHG | HEIGHT: 69 IN | WEIGHT: 230 LBS | BODY MASS INDEX: 34.07 KG/M2

## 2020-12-14 VITALS — TEMPERATURE: 96.9 F

## 2020-12-14 DIAGNOSIS — Z78.9 OTHER SPECIFIED HEALTH STATUS: ICD-10-CM

## 2020-12-14 DIAGNOSIS — I25.10 ATHEROSCLEROTIC HEART DISEASE OF NATIVE CORONARY ARTERY W/OUT ANGINA PECTORIS: ICD-10-CM

## 2020-12-14 DIAGNOSIS — R56.9 UNSPECIFIED CONVULSIONS: ICD-10-CM

## 2020-12-14 PROCEDURE — 99214 OFFICE O/P EST MOD 30 MIN: CPT

## 2020-12-14 RX ORDER — LEVETIRACETAM 750 MG/1
750 TABLET, FILM COATED ORAL TWICE DAILY
Refills: 0 | Status: ACTIVE | COMMUNITY

## 2020-12-14 RX ORDER — ASPIRIN 81 MG
81 TABLET, DELAYED RELEASE (ENTERIC COATED) ORAL
Refills: 0 | Status: ACTIVE | COMMUNITY

## 2020-12-14 NOTE — ASSESSMENT
[FreeTextEntry1] : Mr. KAY is a 34 yo with first time seizure without aura or focal features by history. He is currently taking levetiracetam 750 q12 without side effects. We discussed safety and [facility] law requiring not driving for 12mo from last seizure.  While normally it would be my practice to give AED after seconds seizure, Mr. KAY appears to be tolerating AED welll.  If he continues to tolerate, then I may treat for 3 mo and withdraw it.  Recurrence risk for 1st seizure is 25-50% in 12-18 mo. \par \par 1. televisit in 3 mo\par 2. continue levetiracetam 750 q12 - consider tapering off in 3 mo. \par 3. no driving x 12mo\par 4. Mr. KAY was counselled on workplace safety with seizure risk.\par \par

## 2020-12-14 NOTE — HISTORY OF PRESENT ILLNESS
[FreeTextEntry1] : *** 12/14/2020  ***\par Mr. KAY is a 35y M with h/o CAD and likely stenting 8y ago. He presents now following first convulsion on 11/29.  Mr. KAY was in [facility], had large dinner (which he feels was causal for seizure) and took a nap. While asleep he experienced a convulsion, biting tongue.  He was briefly postictal, but had regained awareness and memory by the time he was being taken to ambulance for transport to hospital.  Mr. KAY recalls no aura or warning. He denies having experienced lost gaps of time, periods of confusion, or other events that might represent focal seizures. Evaluation at [facility] with MRI and overnight EEG was negative.  Mr. KAY was started on levetiracetam 750 q12, and currently reports no side effects. He notes that at baseline he is not an irritable or easily frustrated person. 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 \par ROS - negative x 14 systems. \par  \par \par *** from [facility] admission 11/29/20 ***\par 34 yo male with PMH MI x2, possible stent placement years ago, reported remote history of seizure x1 years ago who presented to the ED after possible seizure like activity. Patient spoken to with [employer] interpre[employer], notes that he fell asleep on the couch and the next thing he remembers was being surrounded by EMS and coming to the hospital. Per wife who witnessed the episode, patient was noted to fall off the couch while having full body shaking similar in description to tonic-clonic convulsions, and he was noted to be foaming at the mouth. Patient also turned blue with minimal response for about 10 minutes, and patient was disoriented upon waking. Patient currently denies any symptoms; specifically he denies headache, dizziness, fatigue, fever/chills, nausea/vomiting, vision changes, difficulty speaking, weakness or numbness, urinary changes. He denies recent travel or sick contacts, and denies recent substance use. Patient denies any similar seizure activity in the past, but per EMS discussion with wife, wife felt that he had a seizure about 4 years ago without any workup.\par \par ROS: All negative except as mentioned in HPI\par PAST MEDICAL & SURGICAL HISTORY: H/O heart surgery\par MEDICATIONS (STANDING): aspirin chewable 81 milliGRAM(s) Oral daily\par MEDICATIONS (PRN): none\par Allergies: No Known Allergies or Intolerances\par \par VITAL SIGNS:\par T(C): 36.6 (30 Nov 2020 04:37), Max: 37.4 (29 Nov 2020 20:28)\par T(F): 97.8 (30 Nov 2020 04:37), Max: 99.4 (29 Nov 2020 20:28)\par HR: 85 (30 Nov 2020 04:37) (85 - 129)\par BP: 122/87 (30 Nov 2020 04:37) (115/80 - 156/84)\par BP(mean): --\par RR: 16 (30 Nov 2020 04:37) (16 - 20)\par SpO2: 99% (30 Nov 2020 04:37) (96% - 100%)\par \par PHYSICAL EXAMINATION:\par General: Well-developed, well nourished, in no acute distress.\par Eyes: Conjunctiva and sclera clear.\par Neck: Supple, nontender\par Lung: no respiratory distress noted\par \par Neurologic:\par - Mental Status: Alert, awake, oriented to person, hospital, and time; Speech is fluent with intact naming, repetition, and comprehension; follows complex commands, no extinction or neglect noted;\par - Cranial Nerves II-XII: VFF, No nystagmus noted, EOMI, PERRLA, V1-V3 intact, no facial asymmetry, t/p midline, SCM/trap intact.\par - Motor: Normal muscle bulk and tone throughout. No myoclonus or tremor. No pronator drift. Strength 5/5 bilaterally in UEs and LEs.\par - Reflexes: 2+ biceps, brachioradialis, patellar, ankle reflexes bilaterally. Plantar responses flexor.\par - Sensory: Intact to light touch, pinprick throughout.\par - Coordination: Finger-nose-finger without dysmetria bilaterally.\par - Gait: Normal steps, base, arm swing, and turning. Romberg testing is negative.\par \par Assessment and Recommendation:\par 34 yo male with PMH MI x2 and possible remote single seizure presenting with possible tonic clonic seizure activity noted by wife at home. Patient currently without symptoms or complaints and no focal findings on exam. CT head without any acute findings. Based on description of event, generalized tonic-clonic seizure with post-ictal state is possible, and given possible past history of seizure, patient should be further evaluated. Given history of MI at early age and noted abnormal EKG in ED, should also rule out cardiac causes of altered mental status.\par \par Recommendations:\par [] seizure precautions\par [] vEEG to monitor for seizure activity\par [] given isolated event, can hold on AEDs for now, pending EEG results\par [] give ativan 2mg for generalized seizure lasting > 3 minutes\par [] if EEG without findings, can get MRI head epilepsy protocol to evaluate for possible seizure focus\par \par Attending Attestation:\par Patient seen and examined with neurology team and above note reviewed and I agree with assessment and plan as outlined. Patient hx as noted and he presented after passing out after dinner and was brought to the hospital. He denied any prior symptoms before passing out and no dizziness, headaches ,\par nausea or vomiting or numbness or weakness. He did report biting his tongue on the right side which was new.  According to his wife who stated in the history that he fell off the couch and was having generalized shaking with foaming at his mouth. He currently denies any symptoms at the moment and denies any seizures in the past. No drug or alcohol use reported.\par \par On exam he is awake and alert and oriented to person, place and time. NO cranial nerve deficits and no nystagmus but right sided tongue laceration noted. No focal motor deficits or sensory loss and no dysmetria. He did have brisk but symmetric reflexes.\par \par Assessment and Plan . 35 year old man with likely generalized tonic clonic\par seizure of unclear etiology\par \par 1. Obtain 24 hour EEG and will start keppra 750mg twice daily as he has high risk of having another breakthru seizure\par 2. Then MRI brain with temporal lobe epilepsy protocol to rule out structural lesions\par 3. Seizure precautions and continue medical mgt and supportive care\par 4. Medical and infectious and metabolic workup\par 5. Check Urine toxicology\par \par Steve Escudero) (Signed 30-Nov-2020 05:29)\par Anmol Garcia) (Signed 30-Nov-2020 14:11)\par

## 2020-12-14 NOTE — DISCUSSION/SUMMARY
[Generalized] : generalized  [Risks Associated with Driving/[place] Law] : As per my usual protocol, the patient was advised in regards to risks and driving privileges associated with the [place] Guidelines.  [Safety Recommendations] : The patient was advised in regards to the risk of seizures and general seizure safety recommendations including not to be bathing alone, climbing to high places and operating heavy machinery. [Compliance with Medications] : The importance of compliance with medications was reinforced. [Medication Side Effects] : High frequency and serious potential medication adverse effects were reviewed with the patient, including but not exclusive to psychiatric effects.  Information sheets on medication side effects were made available to the patient in our clinic.  The patient or advocate agrees to notify us for any concerns.

## 2020-12-16 ENCOUNTER — TRANSCRIPTION ENCOUNTER (OUTPATIENT)
Age: 35
End: 2020-12-16

## 2022-11-08 NOTE — PATIENT PROFILE ADULT - NSPROPOAPRESSUREINJURY_GEN_A_NUR
[Father] : father [Yes] : Patient goes to dentist yearly [Toothpaste] : Primary Fluoride Source: Toothpaste [Needs Immunizations] : needs immunizations [Grade: ____] : Grade: [unfilled] [No] : No cigarette smoke exposure [FreeTextEntry7] : 13 yr [employer]. Hx of ADHD- was on Vyvanse 20 mg po qd.  Has not needed meds since school started so may not be taking them anymore.  [de-identified] : lacrosse no

## 2024-07-27 NOTE — PROGRESS NOTE ADULT - SUBJECTIVE AND OBJECTIVE BOX
*************************************  NEUROLOGY PROGRESS NOTE  **************************************    ANDREW KAY  Male  MRN-[id number]    Subjective:  No acute events; MRI still pending. Interval history/ROS obtained in [place] through attending physician; patient feeling well without complaints. Patient denies headache, loss of consciousness, changes in vision or speech, weakness, numbness.    ROS: As above, otherwise negative.    MEDICATIONS:  MEDICATIONS  (STANDING):  aspirin  chewable 81 milliGRAM(s) Oral daily  heparin   Injectable 5000 Unit(s) SubCutaneous every 12 hours  levETIRAcetam 750 milliGRAM(s) Oral two times a day    MEDICATIONS  (PRN):      VITAL SIGNS:  Vital Signs Last 24 Hrs  T(C): 36.6 (03 Dec 2020 05:46), Max: 37.1 (02 Dec 2020 21:50)  T(F): 97.8 (03 Dec 2020 05:46), Max: 98.7 (02 Dec 2020 21:50)  HR: 73 (03 Dec 2020 05:46) (72 - 73)  BP: 110/74 (03 Dec 2020 05:46) (95/55 - 110/74)  BP(mean): --  RR: 16 (03 Dec 2020 05:46) (16 - 16)  SpO2: 98% (03 Dec 2020 05:46) (98% - 98%)    PHYSICAL EXAMINATION:  General: Well-developed, well nourished, in no acute distress.  Eyes: Conjunctiva and sclera clear.  Neck: supple, nontender, good ROM  Lungs: no respiratory distress    Neurologic:  - Mental Status:  Alert, awake, oriented to person, place, and time; Speech is fluent with intact naming, repetition, and comprehension  - Cranial Nerves II-XII:  VFF, No nystagmus or APD noted, EOMI, V1-V3 intact, no facial asymmetry, t/p midline, SCM/trap intact.  - Motor:  Strength is 5/5 throughout.  There is no pronator drift.  Normal muscle bulk and tone throughout.  - Reflexes:  2+ and symmetric at the biceps, triceps, brachioradialis, knees, and ankles.  Plantar responses flexor.  - Sensory:  Intact to light touch throughout, equal bilaterally.  - Coordination:  Finger-nose-finger without dysmetria.  Rapid alternating hand movements intact.  - Gait:   Normal steps, base, arm swing, and turning. Romberg testing is negative.    LABS:                          15.4   8.86  )-----------( 317      ( 02 Dec 2020 07:06 )             46.2     12-02    136  |  100  |  18  ----------------------------<  82  4.1   |  25  |  0.98    Ca    9.9      02 Dec 2020 07:06  Mg     2.1     12-02      RADIOLOGY & ADDITIONAL STUDIES:    MRI pending.    EEG Classification / Summary:  Normal EEG study, awake / drowsy / asleep    -  -----------------------------------------------------------------------------------------------------    Clinical Impression:  There were no epileptiform abnormalities recorded.     [Normal] : normal bowel sounds, non-tender, no masses, soft, no no hepato-splenomegaly [de-identified] : parkinsonian [de-identified] : shuffling gait

## 2024-10-03 NOTE — ED PROVIDER NOTE - ATTENDING CONTRIBUTION TO CARE
[Left] : left knee [Outside films reviewed] : Outside films reviewed [There are no fractures, subluxations or dislocations. No significant abnormalities are seen] : There are no fractures, subluxations or dislocations. No significant abnormalities are seen [de-identified] :   ----------------------------------------------------------------------------   Left knee exam:   Skin: no significant pertinent finding  Inspection:     (mild) Effusion     (neg) Malalignment     (neg) Swelling     (neg) Quad atrophy     (neg) J-sign  ROM:     0 - 145 degrees of flexion.  Tenderness:     (neg) MJLT     (neg) LJLT     (neg) Medial patellar facet tenderness     (neg) Lateral patellar facet tenderness     (neg) Crepitus     (neg) Patellar grind tenderness     (neg) Patellar tendon     (neg) Quad tendon     Other:  +  posterior tenderness   Stability:       (neg) Lachman     (+) pain with varus stress     (neg) Posterior drawer     (neg) Patellar translation: wnl  Additional tests:     (neg) McMurrays test     (neg) Patellar apprehension     Other:  Strength: 5/5 Q/H/TA/GS/EHL  Neuro: In tact to light touch throughout, DTR's wnl  Vascularity: Extremity warm and well perfused  Gait: mild antalgic with crutches    DR. BLOCH, ATTENDING MD-  I performed a face to face bedside interview with patient regarding history of present illness, review of symptoms and past medical history. I completed an independent physical exam.  I have discussed patient's plan of care with the resident.  Patient examined, well appearing NAD HEENT neck supple, heart sounds nml lungs clear, abd soft  nontender, neuro motor 5/5, sensation intact. Of not ecg abnormal with qs 23avf and v3

## 2025-07-14 NOTE — PROGRESS NOTE ADULT - ASSESSMENT
_________________________________________________________________________________________  ========>>  M E D I C A L   A T T E N D I N G    F O L L O W  U P  N O T E  <<=========  -----------------------------------------------------------------------------------------------------    - Patient seen and examined by me approximately thirty minutes ago.   - In summary,  ANDREW KAY is a 35y year old man admitted post ? syncope   - Patient today overall doing ok, comfortable, eating OK.    VEEG ongoing     ==================>> REVIEW OF SYSTEM <<=================    GEN: no fever, no chills, no pain  RESP: no SOB, no cough, no sputum  CVS: no chest pain, no palpitations, no edema  GI: no abdominal pain, no nausea, no constipation, no diarrhea  : no dysuria, no frequency, no hematuria  Neuro: no headache, no dizziness  Derm : no itching, no rash    ==================>> PHYSICAL EXAM <<=================    GEN: A&O X 3 , NAD , comfortable  HEENT: NCAT, PERRL, MMM, hearing intact  Neck: supple , no JVD appreciated  CVS: S1S2 , regular , No M/R/G appreciated  PULM: CTA B/L,  no W/R/R appreciated  ABD.: soft. non tender, non distended,  bowel sounds present  Extrem: intact pulses , no edema   PSYCH : normal mood,  not anxious      ==================>> MEDICATIONS <<====================    MEDICATIONS  (STANDING):  aspirin  chewable 81 milliGRAM(s) Oral daily  heparin   Injectable 5000 Unit(s) SubCutaneous every 12 hours  levETIRAcetam 750 milliGRAM(s) Oral two times a day    ___________  Active diet:  Diet, Regular:   Consistent Carbohydrate No Snacks (CSTCHO)  DASH/TLC Sodium & Cholesterol Restricted (DASH)  ___________________    ==================>> VITAL SIGNS <<==================    T(C): 36.5 (20 @ 05:07), Max: 36.7 (20 @ 21:11)  HR: 70 (20 @ 05:07) (70 - 72)  BP: 122/63 (20 @ 05:07) (120/76 - 122/63)  BP(mean): --  RR: 17 (20 @ 05:07) (17 - 17)  SpO2: 100% (20 @ 05:07) (100% - 100%)      ==================>> LAB AND IMAGING <<==================                        15.2   8.61  )-----------( 311      ( 01 Dec 2020 07:00 )             44.9            138  |  105  |  14  ----------------------------<  82  4.1   |  22  |  0.96    Ca    9.3      01 Dec 2020 07:00  Mg     2.3         TPro  8.3  /  Alb  4.7  /  TBili  0.3  /  DBili  x   /  AST  23  /  ALT  40  /  AlkPhos  63  11-29    PT/INR - ( 2020 06:00 )   PT: 11.9 SEC;   INR: 1.05     PTT - ( 2020 06:00 )  PTT:30.5 SEC               Urinalysis Basic - ( 2020 13:30 )  Color: LIGHT YELLOW / Appearance: CLEAR / S.018 / pH: 6.0  Gluc: NEGATIVE / Ketone: NEGATIVE  / Bili: NEGATIVE / Urobili: NORMAL   Blood: NEGATIVE / Protein: NEGATIVE / Nitrite: NEGATIVE   Leuk Esterase: NEGATIVE / RBC: x / WBC x   Sq Epi: x / Non Sq Epi: x / Bacteria: x    ___________________________________________________________________________________  ===============>>  A S S E S S M E N T   A N D   P L A N <<===============  ------------------------------------------------------------------------------------------    · Assessment	  34yo male, limited English proficiency, with MHx of of MI x 2 with a possible stent placement a/w  LOC : being worked up for possible seizure seizure;     Problem/Plan - 1:  ·  Problem: Loss of consciousness.  Plan: -Pt with cardiac hx in young age and a possible stent placement 8-10 years ago;   LOC : possibly cardiogenic etiology vs seizure  - r/o cardiogenic cause including arrhythmia   - EP eval for possible ILR >> pt refused   - Telemetry  seizure precautions   - TTE  - EEG on-going   - EP/ Cardio, Neuro follow up    Problem/Plan - 2:  ·  Problem: Elevated lactic acid level.  Plan: -Lactic acid elevated=3.9 c/w episode of hypoxia or seizure  -repeat lactic acid =1.3  -plan as above.   - encourage Po intake / hydration     Problem/Plan - 3:  ·  Problem: SIRS      Initially HR 120s-90s; Leukocytosis 15K;  Likely due to seizure-like activity  Monitor off Abx    Problem/Plan - 4:  ·  Problem: Seizure-like activity  - Neurology following  - seizure precautions  - vEEG to monitor for seizure activity  -  AEDs per neuro   -  ativan PRN  - cardiac workup as above    Problem/Plan - 5:  ·  Problem: h/o CAD and Myocardial infarct  - c/w aspirin  - monitor    Problem/Plan - 6:  Problem: Hyperglycemia. Plan: - r/o diabetes  - slightly elevated sugars   - A1C 5.2  - monitor FS.    Problem/Plan - 7:  ·  Problem: DVT prophylaxis.  Plan: - heparin sq for DVT prophylaxis.     --------------------------------------------  Case discussed with pt, team   Education given on findings and plan of care  ___________________________  H. LAURIE Sotelo.  Pager: [phone]  `
  _________________________________________________________________________________________  ========>>  M E D I C A L   A T T E N D I N G    F O L L O W  U P  N O T E  <<=========  -----------------------------------------------------------------------------------------------------    - Patient seen and examined by me earlier today.   - In summary,  ANDREW KAY is a 35y year old man admitted post ? syncope   - Patient today overall doing ok, comfortable, eating OK.         pt feels well and looking to go home !    MRI done, awaiting results      ==================>> REVIEW OF SYSTEM <<=================    GEN: no fever, no chills, no pain  RESP: no SOB, no cough, no sputum  CVS: no chest pain, no palpitations, no edema  GI: no abdominal pain, no nausea, no constipation, no diarrhea  : no dysuria, no frequency, no hematuria  Neuro: no headache, no dizziness  Derm : no itching, no rash    ==================>> PHYSICAL EXAM <<=================    GEN: A&O X 3 , NAD , comfortable  HEENT: NCAT, PERRL, MMM, hearing intact  Neck: supple , no JVD appreciated  CVS: S1S2 , regular , No M/R/G appreciated  PULM: CTA B/L,  no W/R/R appreciated  ABD.: soft. non tender, non distended,  bowel sounds present  Extrem: intact pulses , no edema   PSYCH : normal mood,  not anxious      ==================>> MEDICATIONS <<====================    aspirin  chewable 81 milliGRAM(s) Oral daily  heparin   Injectable 5000 Unit(s) SubCutaneous every 12 hours  levETIRAcetam 750 milliGRAM(s) Oral two times a day    MEDICATIONS  (PRN):    ___________  Active diet:  Diet, Regular:   Consistent Carbohydrate No Snacks (CSTCHO)  DASH/TLC Sodium & Cholesterol Restricted (DASH)  ___________________    ==================>> VITAL SIGNS <<==================     Vital Signs Last 24 HrsT(C): 36.9 (12-05-20 @ 04:35)  T(F): 98.4 (12-05-20 @ 04:35), Max: 99.1 (12-04-20 @ 11:41)  HR: 68 (12-05-20 @ 04:35) (68 - 83)  BP: 112/72 (12-05-20 @ 04:35)  RR: 16 (12-05-20 @ 04:35) (16 - 16)  SpO2: 100% (12-05-20 @ 04:35) (99% - 100%)     ==================>> LAB AND IMAGING <<==================                        15.9   7.89  )-----------( 294      ( 05 Dec 2020 05:50 )             46.3        12-05    136  |  100  |  15  ----------------------------<  74  4.0   |  23  |  0.96    Ca    10.1      05 Dec 2020 05:50  Phos  3.3     12-05  Mg     2.2     12-05    EEG negative     MR pending  results     < from: Transthoracic Echocardiogram (12.02.20 @ 18:40) >  CONCLUSIONS:  1. Normal mitral valve. Minimal mitral regurgitation.  2. Normal left ventricular internal dimensions and wall  thicknesses.  3. Mild segmental left ventricular systolic dysfunction.  Hypokinesis of the apex, distal septum, and distal anterior  wall.  4. The right ventricle is not well visualized; grossly  normal right ventricular systolic function.  ------------------------------------------------------------------------  Confirmed on  12/2/2020 - 21:28:37 by Kamaljit Alvarado M.D.  < end of copied text >  ___________________________________________________________________________________  ===============>>  A S S E S S M E N T   A N D   P L A N <<===============  ------------------------------------------------------------------------------------------    · Assessment	  36yo male, limited English proficiency, with MHx of of MI x 2 with a possible stent placement a/w  LOC : being worked up for possible seizure seizure;     Problem/Plan - 1:  ·  Problem: Loss of consciousness.  Plan: -Pt with cardiac hx in young age and a possible stent placement 8-10 years ago;   LOC : possibly cardiogenic etiology vs seizure  - r/o cardiogenic cause including arrhythmia   - EP appreciated >> pt refused  ILR   - Telemetry  seizure precautions   - TTE pending ( ? OP if not done today)   - EEG negative      MRI >> likely DC planing if normal     Problem/Plan - 2:  ·  Problem: Seizure-like activity  - Neurology following  - seizure precautions  - vEEG to monitor for seizure activity  -  AEDs per neuro   -  ativan PRN  - cardiac workup as above  - MRI as above    Problem/Plan - 3:  ·  Problem: h/o CAD and Myocardial infarct with likely mild chronic systolic  CHF, stable   - c/w aspirin  - monitor    dispo plan ending MR results     --------------------------------------------  Case discussed with pt, PA  Education given on findings and plan of care  ___________________________  JONAS Sotelo D.O.  Pager: [phone]  `
  _________________________________________________________________________________________  ========>>  M E D I C A L   A T T E N D I N G    F O L L O W  U P  N O T E  <<=========  -----------------------------------------------------------------------------------------------------    - Patient seen and examined by me earlier today.   - In summary,  ANDREW KAY is a 35y year old man admitted post ? syncope   - Patient today overall doing ok, comfortable, eating OK.         pt feels well and looking to go home !  still awaiting MRI !!     ==================>> REVIEW OF SYSTEM <<=================    GEN: no fever, no chills, no pain  RESP: no SOB, no cough, no sputum  CVS: no chest pain, no palpitations, no edema  GI: no abdominal pain, no nausea, no constipation, no diarrhea  : no dysuria, no frequency, no hematuria  Neuro: no headache, no dizziness  Derm : no itching, no rash    ==================>> PHYSICAL EXAM <<=================    GEN: A&O X 3 , NAD , comfortable  HEENT: NCAT, PERRL, MMM, hearing intact  Neck: supple , no JVD appreciated  CVS: S1S2 , regular , No M/R/G appreciated  PULM: CTA B/L,  no W/R/R appreciated  ABD.: soft. non tender, non distended,  bowel sounds present  Extrem: intact pulses , no edema   PSYCH : normal mood,  not anxious      ==================>> MEDICATIONS <<====================    aspirin  chewable 81 milliGRAM(s) Oral daily  heparin   Injectable 5000 Unit(s) SubCutaneous every 12 hours  levETIRAcetam 750 milliGRAM(s) Oral two times a day    ___________  Active diet:  Diet, Regular:   Consistent Carbohydrate No Snacks (CSTCHO)  DASH/TLC Sodium & Cholesterol Restricted (DASH)  ___________________    ==================>> VITAL SIGNS <<==================      Vital Signs Last 24 HrsT(C): 37.3 (12-04-20 @ 11:41)  T(F): 99.1 (12-04-20 @ 11:41), Max: 99.1 (12-04-20 @ 11:41)  HR: 83 (12-04-20 @ 11:41) (74 - 83)  BP: 106/69 (12-04-20 @ 11:41)  RR: 16 (12-04-20 @ 11:41) (16 - 17)  SpO2: 99% (12-04-20 @ 11:41) (99% - 100%)       ==================>> LAB AND IMAGING <<==================         no labs today     EEG negative     MR ordered, pending     < from: Transthoracic Echocardiogram (12.02.20 @ 18:40) >  CONCLUSIONS:  1. Normal mitral valve. Minimal mitral regurgitation.  2. Normal left ventricular internal dimensions and wall  thicknesses.  3. Mild segmental left ventricular systolic dysfunction.  Hypokinesis of the apex, distal septum, and distal anterior  wall.  4. The right ventricle is not well visualized; grossly  normal right ventricular systolic function.  ------------------------------------------------------------------------  Confirmed on  12/2/2020 - 21:28:37 by Kamaljit Alvarado M.D.  < end of copied text >  ___________________________________________________________________________________  ===============>>  A S S E S S M E N T   A N D   P L A N <<===============  ------------------------------------------------------------------------------------------    · Assessment	  36yo male, limited English proficiency, with MHx of of MI x 2 with a possible stent placement a/w  LOC : being worked up for possible seizure seizure;     Problem/Plan - 1:  ·  Problem: Loss of consciousness.  Plan: -Pt with cardiac hx in young age and a possible stent placement 8-10 years ago;   LOC : possibly cardiogenic etiology vs seizure  - r/o cardiogenic cause including arrhythmia   - EP appreciated >> pt refused  ILR   - Telemetry  seizure precautions   - TTE pending ( ? OP if not done today)   - EEG negative      MRI to be done per neurology recom     Problem/Plan - 2:  ·  Problem: Seizure-like activity  - Neurology following  - seizure precautions  - vEEG to monitor for seizure activity  -  AEDs per neuro   -  ativan PRN  - cardiac workup as above  - MRI as above    Problem/Plan - 3:  ·  Problem: h/o CAD and Myocardial infarct with likely mild chronic systolic  CHF, stable   - c/w aspirin  - monitor    --------------------------------------------  Case discussed with pt, PA  Education given on findings and plan of care  ___________________________  H. LAURIE Sotelo.  Pager: [phone]  `
  _________________________________________________________________________________________  ========>>  M E D I C A L   A T T E N D I N G    F O L L O W  U P  N O T E  <<=========  -----------------------------------------------------------------------------------------------------    - Patient seen and examined by me earlier today.   - In summary,  ANDREW KAY is a 35y year old man admitted post ? syncope   - Patient today overall doing ok, comfortable, eating OK.    VEEG ongoing      pt feels well and looking to go home !    ==================>> REVIEW OF SYSTEM <<=================    GEN: no fever, no chills, no pain  RESP: no SOB, no cough, no sputum  CVS: no chest pain, no palpitations, no edema  GI: no abdominal pain, no nausea, no constipation, no diarrhea  : no dysuria, no frequency, no hematuria  Neuro: no headache, no dizziness  Derm : no itching, no rash    ==================>> PHYSICAL EXAM <<=================    GEN: A&O X 3 , NAD , comfortable  HEENT: NCAT, PERRL, MMM, hearing intact  Neck: supple , no JVD appreciated  CVS: S1S2 , regular , No M/R/G appreciated  PULM: CTA B/L,  no W/R/R appreciated  ABD.: soft. non tender, non distended,  bowel sounds present  Extrem: intact pulses , no edema   PSYCH : normal mood,  not anxious       ==================>> MEDICATIONS <<====================    aspirin  chewable 81 milliGRAM(s) Oral daily  heparin   Injectable 5000 Unit(s) SubCutaneous every 12 hours  levETIRAcetam 750 milliGRAM(s) Oral two times a day    ___________  Active diet:  Diet, Regular:   Consistent Carbohydrate No Snacks (CSTCHO)  DASH/TLC Sodium & Cholesterol Restricted (DASH)  ___________________    ==================>> VITAL SIGNS <<==================      Vital Signs Last 24 HrsT(C): 36.6 (12-03-20 @ 05:46)  T(F): 97.8 (12-03-20 @ 05:46), Max: 98.7 (12-02-20 @ 21:50)  HR: 73 (12-03-20 @ 05:46) (72 - 73)  BP: 110/74 (12-03-20 @ 05:46)  RR: 16 (12-03-20 @ 05:46) (16 - 16)  SpO2: 98% (12-03-20 @ 05:46) (98% - 98%)       ==================>> LAB AND IMAGING <<==================                        15.4   8.86  )-----------( 317      ( 02 Dec 2020 07:06 )             46.2        12-02    136  |  100  |  18  ----------------------------<  82  4.1   |  25  |  0.98    Ca    9.9      02 Dec 2020 07:06  Mg     2.1     12-02      WBC count:   8.86 <<== ,  8.61 <<== ,  12.18 <<== ,  15.63 <<==   Hemoglobin:   15.4 <<==,  15.2 <<==,  15.1 <<==,  15.9 <<==  platelets:  317 <==, 311 <==, 296 <==, 357 <==    Creatinine:  0.98  <<==, 0.96  <<==, 1.07  <<==  Sodium:   136  <==, 138  <==, 137  <==    EEG negative     MR ordered, pending     < from: Transthoracic Echocardiogram (12.02.20 @ 18:40) >  CONCLUSIONS:  1. Normal mitral valve. Minimal mitral regurgitation.  2. Normal left ventricular internal dimensions and wall  thicknesses.  3. Mild segmental left ventricular systolic dysfunction.  Hypokinesis of the apex, distal septum, and distal anterior  wall.  4. The right ventricle is not well visualized; grossly  normal right ventricular systolic function.  ------------------------------------------------------------------------  Confirmed on  12/2/2020 - 21:28:37 by Kamaljit Alvarado M.D.  < end of copied text >  ___________________________________________________________________________________  ===============>>  A S S E S S M E N T   A N D   P L A N <<===============  ------------------------------------------------------------------------------------------    · Assessment	  34yo male, limited English proficiency, with MHx of of MI x 2 with a possible stent placement a/w  LOC : being worked up for possible seizure seizure;     Problem/Plan - 1:  ·  Problem: Loss of consciousness.  Plan: -Pt with cardiac hx in young age and a possible stent placement 8-10 years ago;   LOC : possibly cardiogenic etiology vs seizure  - r/o cardiogenic cause including arrhythmia   - EP appreciated >> pt refused  ILR   - Telemetry  seizure precautions   - TTE pending ( ? OP if not done today)   - EEG negative      MRI to be done per neurology recom   - EP/ Cardio, Neuro follow up    Problem/Plan - 2:  ·  Problem: Seizure-like activity  - Neurology following  - seizure precautions  - vEEG to monitor for seizure activity  -  AEDs per neuro   -  ativan PRN  - cardiac workup as above  - MRI as above    Problem/Plan - 3:  ·  Problem: h/o CAD and Myocardial infarct  - c/w aspirin  - monitor    Problem/Plan - 4:  Problem: Hyperglycemia. Plan: - r/o diabetes  - slightly elevated sugars   - A1C 5.2  - monitor FS.    Problem/Plan - 5:  ·  Problem: DVT prophylaxis.  Plan: - heparin sq for DVT prophylaxis.     --------------------------------------------  Case discussed with pt PA  Education given on findings and plan of care  ___________________________  H. LAURIE Sotelo.  Pager: [phone]  `
  _________________________________________________________________________________________  ========>>  M E D I C A L   A T T E N D I N G    F O L L O W  U P  N O T E  <<=========  -----------------------------------------------------------------------------------------------------    - Patient seen and examined by me earlier today.   - In summary,  ANDREW KAY is a 35y year old man admitted post ? syncope   - Patient today overall doing ok, comfortable, eating OK.    VEEG ongoing      pt feels well and looking to go home !    ==================>> REVIEW OF SYSTEM <<=================    GEN: no fever, no chills, no pain  RESP: no SOB, no cough, no sputum  CVS: no chest pain, no palpitations, no edema  GI: no abdominal pain, no nausea, no constipation, no diarrhea  : no dysuria, no frequency, no hematuria  Neuro: no headache, no dizziness  Derm : no itching, no rash    ==================>> PHYSICAL EXAM <<=================    GEN: A&O X 3 , NAD , comfortable  HEENT: NCAT, PERRL, MMM, hearing intact  Neck: supple , no JVD appreciated  CVS: S1S2 , regular , No M/R/G appreciated  PULM: CTA B/L,  no W/R/R appreciated  ABD.: soft. non tender, non distended,  bowel sounds present  Extrem: intact pulses , no edema   PSYCH : normal mood,  not anxious      ==================>> MEDICATIONS <<====================    aspirin  chewable 81 milliGRAM(s) Oral daily  heparin   Injectable 5000 Unit(s) SubCutaneous every 12 hours  levETIRAcetam 750 milliGRAM(s) Oral two times a day  ___________  Active diet:  Diet, Regular:   Consistent Carbohydrate No Snacks (CSTCHO)  DASH/TLC Sodium & Cholesterol Restricted (DASH)  ___________________    ==================>> VITAL SIGNS <<==================    Height (cm): 175.3  Weight (kg): 90.7  BMI (kg/m2): 29.5  Vital Signs Last 24 HrsT(C): 36.8 (12-02-20 @ 11:19)  T(F): 98.2 (12-02-20 @ 11:19), Max: 98.2 (12-02-20 @ 05:34)  HR: 72 (12-02-20 @ 11:19) (72 - 82)  BP: 110/70 (12-02-20 @ 11:19)  RR: 17 (12-02-20 @ 11:19) (16 - 18)  SpO2: 96% (12-02-20 @ 11:19) (96% - 100%)       ==================>> LAB AND IMAGING <<==================                        15.4   8.86  )-----------( 317      ( 02 Dec 2020 07:06 )             46.2        12-02    136  |  100  |  18  ----------------------------<  82  4.1   |  25  |  0.98    Ca    9.9      02 Dec 2020 07:06  Mg     2.1     12-02      WBC count:   8.86 <<== ,  8.61 <<== ,  12.18 <<== ,  15.63 <<==   Hemoglobin:   15.4 <<==,  15.2 <<==,  15.1 <<==,  15.9 <<==  platelets:  317 <==, 311 <==, 296 <==, 357 <==    Creatinine:  0.98  <<==, 0.96  <<==, 1.07  <<==  Sodium:   136  <==, 138  <==, 137  <==    EEG negative     MR and Echo ordered, pending     ___________________________________________________________________________________  ===============>>  A S S E S S M E N T   A N D   P L A N <<===============  ------------------------------------------------------------------------------------------    · Assessment	  34yo male, limited English proficiency, with MHx of of MI x 2 with a possible stent placement a/w  LOC : being worked up for possible seizure seizure;     Problem/Plan - 1:  ·  Problem: Loss of consciousness.  Plan: -Pt with cardiac hx in young age and a possible stent placement 8-10 years ago;   LOC : possibly cardiogenic etiology vs seizure  - r/o cardiogenic cause including arrhythmia   - EP eval for possible ILR >> pt refused   - Telemetry  seizure precautions   - TTE pending ( ? OP if not done today)   - EEG on-going : negative thus far       ? MRI as in vs OP ?> neurology follow up   - EP/ Cardio, Neuro follow up    Problem/Plan - 2:  ·  Problem: Elevated lactic acid level.  Plan: -Lactic acid elevated=3.9 c/w episode of hypoxia or seizure  -repeat lactic acid =1.3  -plan as above.   - encourage Po intake / hydration     Problem/Plan - 3:  ·  Problem: SIRS      Initially HR 120s-90s; Leukocytosis 15K;  Likely due to seizure-like activity  Monitor off Abx    Problem/Plan - 4:  ·  Problem: Seizure-like activity  - Neurology following  - seizure precautions  - vEEG to monitor for seizure activity  -  AEDs per neuro   -  ativan PRN  - cardiac workup as above  - MRI as above    Problem/Plan - 5:  ·  Problem: h/o CAD and Myocardial infarct  - c/w aspirin  - monitor    Problem/Plan - 6:  Problem: Hyperglycemia. Plan: - r/o diabetes  - slightly elevated sugars   - A1C 5.2  - monitor FS.    Problem/Plan - 7:  ·  Problem: DVT prophylaxis.  Plan: - heparin sq for DVT prophylaxis.     --------------------------------------------  Case discussed with pt, PA  Education given on findings and plan of care  ___________________________  HIsaac Sotelo D.O.  Pager: [phone]  `
35yoM w/ PMHx CAD s/p stents? presents after seizure-like activity witnessed by his wife.  EPS consulted for ILR evaluation for cardiac etiology for unresponsiveness.  Extensive discussion regarding benefits versus risks of ILR; patient at this time does not think that a loop recorder will be beneficial to him as he believes it was the food that caused him to become unresponsive.      Patient continues to refuse ILR today.
36 yo male with PMH MI x2 and possible remote single seizure presenting with possible tonic clonic seizure activity noted by wife at home. Patient currently without symptoms or complaints and no focal findings on exam. CT head without any acute findings. Likely generalized tonic-clonic seizure with post-ictal state given evidence of tongue biting on exam. Pending MRI to rule out structural lesion that would predispose to seizures    Recommendations:    [] seizure precautions  [x] vEEG to monitor for seizure activity - no acute seizure activity seen  [] MRI brain, epilepsy protocol to eval for structural lesions  [] continue keppra 750mg BID  [] give ativan 2mg for generalized seizure lasting > 3 minutes  [] patient can follow-up outpatient at [address] [place] [address] with one of the epilepsy physicians regarding need to continue AEDs.    Case discussed and seen with neurology attending Dr. Garcia.
This is a 35 year old man with a  PMHx CAD s/p stents? who presented to the ED after LOC and seizure like activity with possible generalized tonic-clonic seizure with post-ictal state. CT head without any acute finding and EEG revealed no epileptiform abnormalities. Ep consulted for ILR to determine with his LOC etiology was arrhythmia related. Patient currently does not want a ILR. The risk and benefits for the procedure was explained in detail which included but not limited to bleeding, infection, and stroke. Patient expressed understanding an all questions were answered    Plan:  - Continuous telemeteric monitoring  - Monitor electrolytes and replete to k>4 and Mg>2  - Recommended ILR to determine with his LOC etiology was arrhythmia related. Patient currently does not want a ILR. The risk and benefits for the procedure was explained in detail which included but not limited to bleeding, infection, and stroke. Patient expressed understanding an all questions were answered  - Continue care per primary team and neurology      Jory Laughlin PA-C   Patient to be staff with attending. Please awaiting attending addendum   
ingestion